# Patient Record
Sex: MALE | Race: WHITE | NOT HISPANIC OR LATINO | URBAN - METROPOLITAN AREA
[De-identification: names, ages, dates, MRNs, and addresses within clinical notes are randomized per-mention and may not be internally consistent; named-entity substitution may affect disease eponyms.]

---

## 2017-11-15 ENCOUNTER — INPATIENT (INPATIENT)
Facility: HOSPITAL | Age: 77
LOS: 2 days | Discharge: HOME | DRG: 244 | End: 2017-11-18
Attending: HOSPITALIST | Admitting: HOSPITALIST
Payer: COMMERCIAL

## 2017-11-15 VITALS
HEART RATE: 40 BPM | TEMPERATURE: 98 F | RESPIRATION RATE: 16 BRPM | OXYGEN SATURATION: 100 % | DIASTOLIC BLOOD PRESSURE: 51 MMHG | SYSTOLIC BLOOD PRESSURE: 139 MMHG

## 2017-11-15 DIAGNOSIS — Z29.9 ENCOUNTER FOR PROPHYLACTIC MEASURES, UNSPECIFIED: ICD-10-CM

## 2017-11-15 DIAGNOSIS — I10 ESSENTIAL (PRIMARY) HYPERTENSION: ICD-10-CM

## 2017-11-15 DIAGNOSIS — R00.1 BRADYCARDIA, UNSPECIFIED: ICD-10-CM

## 2017-11-15 DIAGNOSIS — R55 SYNCOPE AND COLLAPSE: ICD-10-CM

## 2017-11-15 LAB
ALBUMIN SERPL ELPH-MCNC: 4.5 G/DL — SIGNIFICANT CHANGE UP (ref 3.3–5)
ALP SERPL-CCNC: 53 U/L — SIGNIFICANT CHANGE UP (ref 40–120)
ALT FLD-CCNC: 13 U/L RC — SIGNIFICANT CHANGE UP (ref 10–45)
ANION GAP SERPL CALC-SCNC: 11 MMOL/L — SIGNIFICANT CHANGE UP (ref 5–17)
APTT BLD: 28.1 SEC — SIGNIFICANT CHANGE UP (ref 27.5–37.4)
AST SERPL-CCNC: 14 U/L — SIGNIFICANT CHANGE UP (ref 10–40)
BASE EXCESS BLDV CALC-SCNC: 2 MMOL/L — SIGNIFICANT CHANGE UP (ref -2–2)
BASOPHILS # BLD AUTO: 0 K/UL — SIGNIFICANT CHANGE UP (ref 0–0.2)
BASOPHILS NFR BLD AUTO: 0 % — SIGNIFICANT CHANGE UP (ref 0–2)
BILIRUB SERPL-MCNC: 0.5 MG/DL — SIGNIFICANT CHANGE UP (ref 0.2–1.2)
BUN SERPL-MCNC: 19 MG/DL — SIGNIFICANT CHANGE UP (ref 7–23)
CA-I SERPL-SCNC: 1.17 MMOL/L — SIGNIFICANT CHANGE UP (ref 1.12–1.3)
CALCIUM SERPL-MCNC: 9.2 MG/DL — SIGNIFICANT CHANGE UP (ref 8.4–10.5)
CHLORIDE BLDV-SCNC: 104 MMOL/L — SIGNIFICANT CHANGE UP (ref 96–108)
CHLORIDE SERPL-SCNC: 101 MMOL/L — SIGNIFICANT CHANGE UP (ref 96–108)
CO2 BLDV-SCNC: 31 MMOL/L — HIGH (ref 22–30)
CO2 SERPL-SCNC: 27 MMOL/L — SIGNIFICANT CHANGE UP (ref 22–31)
CREAT SERPL-MCNC: 0.95 MG/DL — SIGNIFICANT CHANGE UP (ref 0.5–1.3)
EOSINOPHIL # BLD AUTO: 0.2 K/UL — SIGNIFICANT CHANGE UP (ref 0–0.5)
EOSINOPHIL NFR BLD AUTO: 2.3 % — SIGNIFICANT CHANGE UP (ref 0–6)
GAS PNL BLDV: 133 MMOL/L — LOW (ref 136–145)
GAS PNL BLDV: SIGNIFICANT CHANGE UP
GLUCOSE BLDV-MCNC: 152 MG/DL — HIGH (ref 70–99)
GLUCOSE SERPL-MCNC: 147 MG/DL — HIGH (ref 70–99)
HCO3 BLDV-SCNC: 29 MMOL/L — SIGNIFICANT CHANGE UP (ref 21–29)
HCT VFR BLD CALC: 48.4 % — SIGNIFICANT CHANGE UP (ref 39–50)
HCT VFR BLDA CALC: 50 % — SIGNIFICANT CHANGE UP (ref 39–50)
HGB BLD CALC-MCNC: 16.4 G/DL — SIGNIFICANT CHANGE UP (ref 13–17)
HGB BLD-MCNC: 16.2 G/DL — SIGNIFICANT CHANGE UP (ref 13–17)
INR BLD: 1.05 RATIO — SIGNIFICANT CHANGE UP (ref 0.88–1.16)
LACTATE BLDV-MCNC: 2 MMOL/L — SIGNIFICANT CHANGE UP (ref 0.7–2)
LIDOCAIN IGE QN: 52 U/L — SIGNIFICANT CHANGE UP (ref 7–60)
LYMPHOCYTES # BLD AUTO: 2.2 K/UL — SIGNIFICANT CHANGE UP (ref 1–3.3)
LYMPHOCYTES # BLD AUTO: 20.9 % — SIGNIFICANT CHANGE UP (ref 13–44)
MAGNESIUM SERPL-MCNC: 2.1 MG/DL — SIGNIFICANT CHANGE UP (ref 1.6–2.6)
MCHC RBC-ENTMCNC: 30.6 PG — SIGNIFICANT CHANGE UP (ref 27–34)
MCHC RBC-ENTMCNC: 33.4 GM/DL — SIGNIFICANT CHANGE UP (ref 32–36)
MCV RBC AUTO: 91.6 FL — SIGNIFICANT CHANGE UP (ref 80–100)
MONOCYTES # BLD AUTO: 0.7 K/UL — SIGNIFICANT CHANGE UP (ref 0–0.9)
MONOCYTES NFR BLD AUTO: 6.3 % — SIGNIFICANT CHANGE UP (ref 2–14)
NEUTROPHILS # BLD AUTO: 7.5 K/UL — HIGH (ref 1.8–7.4)
NEUTROPHILS NFR BLD AUTO: 70.5 % — SIGNIFICANT CHANGE UP (ref 43–77)
NT-PROBNP SERPL-SCNC: 43 PG/ML — SIGNIFICANT CHANGE UP (ref 0–300)
PCO2 BLDV: 56 MMHG — HIGH (ref 35–50)
PH BLDV: 7.33 — LOW (ref 7.35–7.45)
PHOSPHATE SERPL-MCNC: 3.8 MG/DL — SIGNIFICANT CHANGE UP (ref 2.5–4.5)
PLATELET # BLD AUTO: 263 K/UL — SIGNIFICANT CHANGE UP (ref 150–400)
PO2 BLDV: 21 MMHG — LOW (ref 25–45)
POTASSIUM BLDV-SCNC: 3.8 MMOL/L — SIGNIFICANT CHANGE UP (ref 3.5–5)
POTASSIUM SERPL-MCNC: 4.2 MMOL/L — SIGNIFICANT CHANGE UP (ref 3.5–5.3)
POTASSIUM SERPL-SCNC: 4.2 MMOL/L — SIGNIFICANT CHANGE UP (ref 3.5–5.3)
PROT SERPL-MCNC: 6.4 G/DL — SIGNIFICANT CHANGE UP (ref 6–8.3)
PROTHROM AB SERPL-ACNC: 11.4 SEC — SIGNIFICANT CHANGE UP (ref 9.8–12.7)
RBC # BLD: 5.28 M/UL — SIGNIFICANT CHANGE UP (ref 4.2–5.8)
RBC # FLD: 11.8 % — SIGNIFICANT CHANGE UP (ref 10.3–14.5)
SAO2 % BLDV: 25 % — LOW (ref 67–88)
SODIUM SERPL-SCNC: 139 MMOL/L — SIGNIFICANT CHANGE UP (ref 135–145)
TROPONIN T SERPL-MCNC: <0.01 NG/ML — SIGNIFICANT CHANGE UP (ref 0–0.06)
WBC # BLD: 10.6 K/UL — HIGH (ref 3.8–10.5)
WBC # FLD AUTO: 10.6 K/UL — HIGH (ref 3.8–10.5)

## 2017-11-15 PROCEDURE — 93010 ELECTROCARDIOGRAM REPORT: CPT

## 2017-11-15 PROCEDURE — 71010: CPT | Mod: 26

## 2017-11-15 PROCEDURE — 99285 EMERGENCY DEPT VISIT HI MDM: CPT | Mod: 25

## 2017-11-15 PROCEDURE — 99223 1ST HOSP IP/OBS HIGH 75: CPT | Mod: GC

## 2017-11-15 RX ORDER — HEPARIN SODIUM 5000 [USP'U]/ML
5000 INJECTION INTRAVENOUS; SUBCUTANEOUS EVERY 8 HOURS
Qty: 0 | Refills: 0 | Status: DISCONTINUED | OUTPATIENT
Start: 2017-11-15 | End: 2017-11-18

## 2017-11-15 RX ORDER — ASPIRIN/CALCIUM CARB/MAGNESIUM 324 MG
1 TABLET ORAL
Qty: 0 | Refills: 0 | COMMUNITY

## 2017-11-15 RX ORDER — ATROPINE SULFATE 0.1 MG/ML
0.5 SYRINGE (ML) INJECTION ONCE
Qty: 0 | Refills: 0 | Status: DISCONTINUED | OUTPATIENT
Start: 2017-11-15 | End: 2017-11-18

## 2017-11-15 RX ORDER — SODIUM CHLORIDE 9 MG/ML
3 INJECTION INTRAMUSCULAR; INTRAVENOUS; SUBCUTANEOUS ONCE
Qty: 0 | Refills: 0 | Status: COMPLETED | OUTPATIENT
Start: 2017-11-15 | End: 2017-11-15

## 2017-11-15 RX ORDER — ATROPINE SULFATE 0.1 MG/ML
0.5 SYRINGE (ML) INJECTION ONCE
Qty: 0 | Refills: 0 | Status: DISCONTINUED | OUTPATIENT
Start: 2017-11-15 | End: 2017-11-15

## 2017-11-15 RX ORDER — SODIUM CHLORIDE 9 MG/ML
1000 INJECTION INTRAMUSCULAR; INTRAVENOUS; SUBCUTANEOUS ONCE
Qty: 0 | Refills: 0 | Status: COMPLETED | OUTPATIENT
Start: 2017-11-15 | End: 2017-11-15

## 2017-11-15 RX ORDER — TRIAMTERENE/HYDROCHLOROTHIAZID 75 MG-50MG
1 TABLET ORAL
Qty: 0 | Refills: 0 | COMMUNITY

## 2017-11-15 RX ORDER — INFLUENZA VIRUS VACCINE 15; 15; 15; 15 UG/.5ML; UG/.5ML; UG/.5ML; UG/.5ML
0.5 SUSPENSION INTRAMUSCULAR ONCE
Qty: 0 | Refills: 0 | Status: DISCONTINUED | OUTPATIENT
Start: 2017-11-15 | End: 2017-11-18

## 2017-11-15 RX ADMIN — SODIUM CHLORIDE 3 MILLILITER(S): 9 INJECTION INTRAMUSCULAR; INTRAVENOUS; SUBCUTANEOUS at 12:53

## 2017-11-15 RX ADMIN — SODIUM CHLORIDE 2000 MILLILITER(S): 9 INJECTION INTRAMUSCULAR; INTRAVENOUS; SUBCUTANEOUS at 13:33

## 2017-11-15 RX ADMIN — HEPARIN SODIUM 5000 UNIT(S): 5000 INJECTION INTRAVENOUS; SUBCUTANEOUS at 21:32

## 2017-11-15 NOTE — ED ADULT NURSE REASSESSMENT NOTE - NS ED NURSE REASSESS COMMENT FT1
Pt aaox4. Denies CP Dizziness weakness or SOB. Pt maintained on CM and Pacer pads. HR 80's. Report given to Holding.

## 2017-11-15 NOTE — H&P ADULT - PROBLEM SELECTOR PLAN 1
- 2/2 bradycardia in the setting of found first degree heart block - - not on any AV charles blocking agents, no prior episodes in recent history   - witnessed, alert immediately after awaking, no reported tonic-clonic episodes and no reported head trauma - 2/2 bradycardia in the setting of found first degree heart block - not on any AV charles blocking agents, no prior episodes in recent history   - witnessed, alert immediately after awaking, no reported tonic-clonic episodes and no reported head trauma  - no infectious complaints that may have triggered it  - check stress echo to eval heart function  - check tsh, a1c, lipid panel

## 2017-11-15 NOTE — H&P ADULT - NSHPSOCIALHISTORY_GEN_ALL_CORE
Works as a [employer] Currently. Reports drinking 1 glass of wine or 1 beer per day. Denies smoking, denies recreational drug use Works as a [employer] Currently. Reports drinking 1 glass of wine or 1 beer per day. Denies smoking, denies recreational drug use. Ambulates without assistance.

## 2017-11-15 NOTE — ED PROVIDER NOTE - ATTENDING CONTRIBUTION TO CARE
Private Physician Ermias Kapoor [facility]  77y male pmh HTN,HLD, Rbbb,BPH, pt was in pmd office for routine visit for referral to Derm for growth on rt forehead. Pt was sitting waiting and had syncopal event. No chest cp,sob, fever chills. Now feels fine. PE WDWN male nad normocephalic atraumatic rt forehead raised nontender growth, chest clear anterior & posterior cv slow hr. cv no rubs, gallops or murmurs, abd soft neuro no focal defects  Moi German MD, Facep

## 2017-11-15 NOTE — PROGRESS NOTE ADULT - PROBLEM SELECTOR PLAN 1
-With significant Bradycardia -With significant Bradycardia  -Currently HR improved to NSR 90's without medications  -Monitor on telemetry, NPO after midnight for possible pacemaker if patient remains bradycardic  -Follow up TSH  -Obtain transthoracic echocardiogram   -Obtain Stress Test to evaluate for ischemic sinus node dysfunction     [place] [id number]

## 2017-11-15 NOTE — ED PROVIDER NOTE - OBJECTIVE STATEMENT
77 y.o. male BIBA from the [facility] office at [address] after being found to be bradycardic and having a syncopal episode.  Pt was following up for a referral due to a skin lesion on the right side of forehead.  Has a history of HTN, not on any beta blockers of CCB's.  Was at the office when he got lightheaded while sitting and passed out, seen by office staff that denied any seizure like activity.  Never had any CP, or SoB, currently without complaints.

## 2017-11-15 NOTE — H&P ADULT - PROBLEM SELECTOR PLAN 4
- Awaiting EP recs, if no plan for PPM - - HSQ 5000 q8    Emelia Neil  PGY1  [phone] - Awaiting EP recs, patient currently asymptomatic but has episodes of dropped beats with predominant 1st degree AV block.      Emelia Neil  PGY1  [phone] DVT PPX: hsq  No PT/OT needs    Emelia Neil  PGY1  [phone]

## 2017-11-15 NOTE — PROGRESS NOTE ADULT - ASSESSMENT
77 year old male with PMH of HTN who presents from outside medical office for syncope. Patient was found to have bradycardia with 1st degree heart block.

## 2017-11-15 NOTE — ED ADULT NURSE REASSESSMENT NOTE - NS ED NURSE REASSESS COMMENT FT1
Patient c/o feeling dizzy while heart rate in 20's. Denies shortness of breath or chest pain. Dr. German made aware. Pacer pads placed on patient as a precaution.

## 2017-11-15 NOTE — H&P ADULT - ASSESSMENT
77 year old male with PMH of HTN who presented from [facility] for syncopal episode and found to have first degree heart block 77 year old male with PMH of HTN who presented from [facility] for syncopal episode and found to have symptomatic bradycardia w/  first degree heart block.

## 2017-11-15 NOTE — H&P ADULT - PROBLEM SELECTOR PLAN 3
- at home on triamterene- HCTZ and amlodipine-benazepril   - will continue to monitor - at home on triamterene- HCTZ and amlodipine-benazepril   - will continue to monitor, off of any hypertensive - - pressures currently within range

## 2017-11-15 NOTE — H&P ADULT - ATTENDING COMMENTS
Symptomatic bradycardia w/ 1st deg heart block, RBBB and syncope. EP consulted - to check stress echo and place NPO MN for possible PPM tomorrow. Monitor BP off Bp meds for now as BP controlled.

## 2017-11-15 NOTE — ED ADULT NURSE REASSESSMENT NOTE - NS ED NURSE REASSESS COMMENT FT1
resting in bed, vital sstable, denies c/o, +adm, wife present resting in bed, vital sstable, denies c/o, +adm, wife present, +pacer pads in place

## 2017-11-15 NOTE — H&P ADULT - NSHPPHYSICALEXAM_GEN_ALL_CORE
PHYSICAL EXAM:  Constitutional: Elderly gentleman, appears well lying comfortable  Eyes: PERRL, EOMI.   ENMT: No evidence of mucosal lesions or ulcers on inspection of the oral airway  Neck: Supple, no evidence of cervical lymphadenopathy, thyroid not enlarged    Respiratory: No evidence of increased respiratory effort on inspection. On auscultation, lungs are clear. No wheezes, rales or rhonchi   Cardiovascular: Regular rate and rhythm. Normal S1 and S2, no murmurs rubs or gallops  Gastrointestinal: Abdomen soft, nondistended. Bowel sounds present. No organomegaly   Extremities: Extremities well perfused. No cyanosis, pitting or edema. 2+ DP pulses   Neurological: AAO x 3. Cr II-XII intact. Grossly nonfocal, moving all extremities spontaneously   Skin: On temple note of 2 x 2 cm waxy and dark elevated lesion with abutting elevated salmon colored and elevated lesion.    Lymph Nodes: No cervical or supraclavicular lymphadenopathy appreciated. PHYSICAL EXAM:  Constitutional: Elderly gentleman, appears well lying comfortable  Eyes: PERRL, EOMI.   ENMT: No evidence of mucosal lesions or ulcers on inspection of the oral airway  Neck: Supple, no evidence of cervical lymphadenopathy, thyroid not enlarged    Respiratory: No evidence of increased respiratory effort on inspection. On auscultation, lungs are clear. No wheezes, rales or rhonchi   Cardiovascular: Regular rate and rhythm. Normal S1 and S2, no murmurs rubs or gallops  Gastrointestinal: Abdomen soft, nondistended. Bowel sounds present. No organomegaly   Extremities: Extremities well perfused. No cyanosis, pitting or edema. 2+ DP pulses   Neurological: AAO x 3. Cr II-XII intact. Grossly nonfocal, moving all extremities spontaneously   Skin: On temple note of 2 x 2 cm waxy and dark elevated lesion with abutting elevated salmon colored elevated lesion w/o TTP. r cheek w/ seborrheic keratosis.  Lymph Nodes: No cervical or supraclavicular lymphadenopathy appreciated.

## 2017-11-15 NOTE — H&P ADULT - NSHPLABSRESULTS_GEN_ALL_CORE
16.2   10.6  )-----------( 263      ( 15 Nov 2017 12:24 )             48.4     11-15    139  |  101  |  19  ----------------------------<  147<H>  4.2   |  27  |  0.95    Ca    9.2      15 Nov 2017 12:24  Phos  3.8     11-15  Mg     2.1     11-15    TPro  6.4  /  Alb  4.5  /  TBili  0.5  /  DBili  x   /  AST  14  /  ALT  13  /  AlkPhos  53  11-15    CXR: performed, unable to access image 16.2   10.6  )-----------( 263      ( 15 Nov 2017 12:24 )             48.4     11-15    139  |  101  |  19  ----------------------------<  147<H>  4.2   |  27  |  0.95    Ca    9.2      15 Nov 2017 12:24  Phos  3.8     11-15  Mg     2.1     11-15    TPro  6.4  /  Alb  4.5  /  TBili  0.5  /  DBili  x   /  AST  14  /  ALT  13  /  AlkPhos  53  11-15    LABS REVIEWED - wbc mildly elevated, trop/bnp wnl, bmp wnl.  CXR personally reviewed: clear, no infiltrates  EKG personally reviewed sinus atif W/ 1st deg heart block, hr 38 qtc 418, no sted, RBBB.

## 2017-11-15 NOTE — H&P ADULT - PROBLEM SELECTOR PLAN 2
- found to have 1st degree heart block with intermittent episodes of second degree - - not on any AV charles blocking agents   - EP consulted for evaluation for pacemaker placement   - currently Asymptomatic, elicited with orthostatic posturing and has transient periods during interview - found to have 1st degree heart block with intermittent episodes of second degree and notable right bundle - - not on any AV charles blocking agents   - EP consulted for evaluation for pacemaker placement   - currently Asymptomatic, elicited with orthostatic posturing and has transient periods during interview - found to have symptomatic 1st degree heart block, not on any AV charles blocking agents, w/ hr in 30s  - EP consulted for evaluation for pacemaker placement - place NPO MN for possible PPM - found to have symptomatic 1st degree heart block, not on any AV charles blocking agents, w/ hr in 30s  - EP consulted for evaluation for pacemaker placement - place NPO MN for possible PPM  - keep atropine at bedside prn, keep pacer pads on  - tele

## 2017-11-15 NOTE — ED ADULT NURSE REASSESSMENT NOTE - NS ED NURSE REASSESS COMMENT FT1
At this time awaiting bed assignment. Close monitoring provided. Denies pain or discomfort at this time.

## 2017-11-15 NOTE — ED ADULT NURSE REASSESSMENT NOTE - NS ED NURSE REASSESS COMMENT FT1
Patient's heart rate noted to be 29. Awake and alert, denies any pain or discomfort. BP systolic in 90's. CHARITO Hernandes made aware. IVF fluids administered as ordered.

## 2017-11-15 NOTE — H&P ADULT - HISTORY OF PRESENT ILLNESS
77 year old male with PMH of HTN who presents from outside medical office for syncope. Patient was at [facility] for dermatology referral and reports sitting down, feeling like he was going to faint and had passed out. Denies bearing down, chest pain, palpitations or shortness of breath. Denies falling or hitting his head at the time. Fainted momentarily but awoke and knew where he was without the need for reorientation. At the office was noted to have bradycardia with 1st degree heart block which persisted while in the ER. As per tele strip, also had transient period of 2nd degree AV block with bradycardia into the 20s. Was given fluid bolus with resolution.     Of note, patient has had 1 episode 40 years ago where he syncopized ( as per wife - - was given novocaine at the time). No syncopal episodes in the recent history. Has never seen a cardiologist in the past. Reports following up regularly wit his PCP who now left the practice. 77 year old male with PMH of HTN who presents from outside medical office for syncope. Patient was at [facility] asking for dermatology referral for a facial lesion. He reports while sitting down, feeling like he was going to faint and syncopized for a few seconds. Denies bearing down, chest pain, palpitations or shortness of breath or postsyncopal shaking, bowel/bladder incontinence. Denies falling or hitting his head at the time or any trauma. Fainted momentarily but awoke and knew where he was without the need for reorientation. Reports feeling well otherwise this week - no fevers, chills, abd. pain, cough, diarrhea, rash, dysuria. At the office was noted to have bradycardia with 1st degree heart block which persisted while in the ER. Was given fluid bolus in the ED.    Of note, patient has had 1 episode 40 years ago where he syncopized ( as per wife - - was given novocaine at the time). No syncopal episodes in the recent history. Has never seen a cardiologist in the past. Reports following up regularly with his PCP who now left the practice.

## 2017-11-15 NOTE — ED ADULT NURSE NOTE - OBJECTIVE STATEMENT
BIBA from PMD office. Patient states he went to his PMD to obtain a referral to dermatologist for skin growth on the right side of his forehead and he passed out while sitting on the examination table while waiting for his doctor in the room. Denies falling to the ground or hitting his head. At this time denies any chest pain or shortness of breath. EKG done at bedside and patient placed on telemetry. Irregular heart rate and bradycardia, rate in 30's - 40's noted on cardiac monitor.

## 2017-11-15 NOTE — ED PROVIDER NOTE - PROGRESS NOTE DETAILS
Got a call from war room that pt had episode of 2nd degree heart block, will call cards consult.  Pt without complaints.  Donnell pt's heart rate in the mid 20's called EP fellow will be down ASAP.  Pt without chest pain, lightheaded, or SoB.  Donnell Pt laying supine,  HR 55

## 2017-11-16 ENCOUNTER — TRANSCRIPTION ENCOUNTER (OUTPATIENT)
Age: 77
End: 2017-11-16

## 2017-11-16 LAB
ALBUMIN SERPL ELPH-MCNC: 4.1 G/DL — SIGNIFICANT CHANGE UP (ref 3.3–5)
ALP SERPL-CCNC: 50 U/L — SIGNIFICANT CHANGE UP (ref 40–120)
ALT FLD-CCNC: 15 U/L RC — SIGNIFICANT CHANGE UP (ref 10–45)
ANION GAP SERPL CALC-SCNC: 14 MMOL/L — SIGNIFICANT CHANGE UP (ref 5–17)
AST SERPL-CCNC: 20 U/L — SIGNIFICANT CHANGE UP (ref 10–40)
BILIRUB SERPL-MCNC: 0.9 MG/DL — SIGNIFICANT CHANGE UP (ref 0.2–1.2)
BUN SERPL-MCNC: 17 MG/DL — SIGNIFICANT CHANGE UP (ref 7–23)
CALCIUM SERPL-MCNC: 8.9 MG/DL — SIGNIFICANT CHANGE UP (ref 8.4–10.5)
CHLORIDE SERPL-SCNC: 102 MMOL/L — SIGNIFICANT CHANGE UP (ref 96–108)
CHOLEST SERPL-MCNC: 175 MG/DL — SIGNIFICANT CHANGE UP (ref 10–199)
CK MB BLD-MCNC: 2.5 % — SIGNIFICANT CHANGE UP (ref 0–3.5)
CK MB CFR SERPL CALC: 10.1 NG/ML — HIGH (ref 0–6.7)
CK SERPL-CCNC: 409 U/L — HIGH (ref 30–200)
CO2 SERPL-SCNC: 25 MMOL/L — SIGNIFICANT CHANGE UP (ref 22–31)
CREAT SERPL-MCNC: 0.87 MG/DL — SIGNIFICANT CHANGE UP (ref 0.5–1.3)
GLUCOSE SERPL-MCNC: 95 MG/DL — SIGNIFICANT CHANGE UP (ref 70–99)
HBA1C BLD-MCNC: 5.7 % — HIGH (ref 4–5.6)
HCT VFR BLD CALC: 47.1 % — SIGNIFICANT CHANGE UP (ref 39–50)
HDLC SERPL-MCNC: 50 MG/DL — SIGNIFICANT CHANGE UP (ref 40–125)
HGB BLD-MCNC: 15.6 G/DL — SIGNIFICANT CHANGE UP (ref 13–17)
LIPID PNL WITH DIRECT LDL SERPL: 103 MG/DL — SIGNIFICANT CHANGE UP
MAGNESIUM SERPL-MCNC: 2.2 MG/DL — SIGNIFICANT CHANGE UP (ref 1.6–2.6)
MCHC RBC-ENTMCNC: 30.3 PG — SIGNIFICANT CHANGE UP (ref 27–34)
MCHC RBC-ENTMCNC: 33 GM/DL — SIGNIFICANT CHANGE UP (ref 32–36)
MCV RBC AUTO: 91.6 FL — SIGNIFICANT CHANGE UP (ref 80–100)
PHOSPHATE SERPL-MCNC: 3.4 MG/DL — SIGNIFICANT CHANGE UP (ref 2.5–4.5)
PLATELET # BLD AUTO: 276 K/UL — SIGNIFICANT CHANGE UP (ref 150–400)
POTASSIUM SERPL-MCNC: 3.8 MMOL/L — SIGNIFICANT CHANGE UP (ref 3.5–5.3)
POTASSIUM SERPL-SCNC: 3.8 MMOL/L — SIGNIFICANT CHANGE UP (ref 3.5–5.3)
PROT SERPL-MCNC: 6.4 G/DL — SIGNIFICANT CHANGE UP (ref 6–8.3)
RBC # BLD: 5.14 M/UL — SIGNIFICANT CHANGE UP (ref 4.2–5.8)
RBC # FLD: 12.2 % — SIGNIFICANT CHANGE UP (ref 10.3–14.5)
SODIUM SERPL-SCNC: 141 MMOL/L — SIGNIFICANT CHANGE UP (ref 135–145)
TOTAL CHOLESTEROL/HDL RATIO MEASUREMENT: 3.5 RATIO — SIGNIFICANT CHANGE UP (ref 3.4–9.6)
TRIGL SERPL-MCNC: 112 MG/DL — SIGNIFICANT CHANGE UP (ref 10–149)
TROPONIN T SERPL-MCNC: <0.01 NG/ML — SIGNIFICANT CHANGE UP (ref 0–0.06)
TSH SERPL-MCNC: 2.24 UIU/ML — SIGNIFICANT CHANGE UP (ref 0.27–4.2)
WBC # BLD: 11.4 K/UL — HIGH (ref 3.8–10.5)
WBC # FLD AUTO: 11.4 K/UL — HIGH (ref 3.8–10.5)

## 2017-11-16 PROCEDURE — 99233 SBSQ HOSP IP/OBS HIGH 50: CPT | Mod: GC

## 2017-11-16 PROCEDURE — 93306 TTE W/DOPPLER COMPLETE: CPT | Mod: 26

## 2017-11-16 RX ADMIN — HEPARIN SODIUM 5000 UNIT(S): 5000 INJECTION INTRAVENOUS; SUBCUTANEOUS at 21:43

## 2017-11-16 RX ADMIN — HEPARIN SODIUM 5000 UNIT(S): 5000 INJECTION INTRAVENOUS; SUBCUTANEOUS at 13:10

## 2017-11-16 RX ADMIN — HEPARIN SODIUM 5000 UNIT(S): 5000 INJECTION INTRAVENOUS; SUBCUTANEOUS at 05:22

## 2017-11-16 NOTE — PROGRESS NOTE ADULT - PROBLEM SELECTOR PLAN 3
- at home on triamterene- HCTZ and amlodipine-benazepril   - will continue to monitor, off of any hypertensive - - pressures currently within range

## 2017-11-16 NOTE — DISCHARGE NOTE ADULT - PATIENT PORTAL LINK FT
“You can access the FollowHealth Patient Portal, offered by [facility], by registering with the following website: [email or web address]”

## 2017-11-16 NOTE — DISCHARGE NOTE ADULT - PLAN OF CARE
resolution You were brought into the hospital because you had fainted at the doctor's office where you were found to have a very slow heart rate. Because of this, you had a pacemaker placed. Please follow up with the electrophysiologist within 1 week of leaving the hospital Treatment You were brought into the hospital because you had fainted and were found to have a very slow heart rate. Due to this, you had a pacemaker placed during this admission. Please follow up with the electrophysiologist within 1 week of leaving the hospital. During this admission, your blood pressure medications were held because of a low heart rate and normal blood pressures. Please follow up with your primary care physician within 1 week of leaving the hospital prior to restarting this medications. During this admission, your blood pressure medications were held because of a low heart rate and normal blood pressures. You will be sent on a new blood pressure medication. Please follow up with your primary care physician within 1 week for medication adjustment.

## 2017-11-16 NOTE — PROGRESS NOTE ADULT - PROBLEM SELECTOR PLAN 4
DVT PPX: hsq  No PT/OT needs    Emelia Neil  PGY1  [phone] DVT PPX: hsq  No PT/OT needs    NPO after midnight for PPM placement, patient unable to get nuclear stress test as it requires AV charles blocking agent. Will touch base with cardiology but it will not impact patient getting PPM tomorrow     Emelia Neil  PGY1  [phone]

## 2017-11-16 NOTE — PROGRESS NOTE ADULT - SUBJECTIVE AND OBJECTIVE BOX
INTERVAL HPI/OVERNIGHT EVENTS: Patient resting comfortably in bed, denies c/o dizziness, lightheadedness, CP, palpitations or SOB. Overnight periods of sinus bradycardia 30's- 40's with 3.3 second pause    MEDICATIONS  (STANDING):  heparin  Injectable 5000 Unit(s) SubCutaneous every 8 hours  influenza   Vaccine 0.5 milliLiter(s) IntraMuscular once    MEDICATIONS  (PRN):  atropine Injectable 0.5 milliGRAM(s) IV Push once PRN please keep at bedside for MD if pt has symptomatic bradycardia and decompensates.      Allergies    No Known Allergies    Intolerances      ROS:  General: Pt denies fever/chills    Cardiovascular: denies chest pain/palpitations/leg edema    Respiratory and Thorax: denies SOB/cough/wheezing    Gastrointestinal: denies abdominal pain/diarrhea/constipation/bloody stool    Musculoskeletal: denies joint pain or swelling, denies restricted motion    Skin: denies rashes/sores    Hematologic: denies abnormal bleeding    Vital Signs Last 24 Hrs  T(C): 36.6 (16 Nov 2017 04:31), Max: 36.7 (15 Nov 2017 19:24)  T(F): 97.9 (16 Nov 2017 04:31), Max: 98 (15 Nov 2017 19:24)  HR: 67 (16 Nov 2017 07:03) (50 - 81)  BP: 129/72 (16 Nov 2017 07:03) (105/53 - 164/81)  RR: 18 (16 Nov 2017 04:31) (15 - 18)  SpO2: 95% (16 Nov 2017 04:31) (95% - 98%)    Physical Exam:  Constitutional: well developed, well nourished,  and no acute distress    Neurological: Alert & Oriented x 3,  no focal deficits    HEENT:   Neck supple.    Respiratory: CTA B/L, No wheezing/crackles/rhonchi    Cardiovascular: (+) S1 & S2, RRR    Gastrointestinal: soft, NT, nondistended, (+) BS    Genitourinary: non distended bladder, voiding freely    Extremities: No pedal edema, No clubbing, No cyanosis    Skin:  normal skin color and pigmentation, no skin lesions      LABS:                        15.6   11.4  )-----------( 276      ( 16 Nov 2017 06:49 )             47.1     11-16    141  |  102  |  17  ----------------------------<  95  3.8   |  25  |  0.87    Ca    8.9      16 Nov 2017 06:49  Phos  3.4     11-16  Mg     2.2     11-16    TPro  6.4  /  Alb  4.1  /  TBili  0.9  /  DBili  x   /  AST  20  /  ALT  15  /  AlkPhos  50  11-16    PT/INR - ( 15 Nov 2017 12:24 )   PT: 11.4 sec;   INR: 1.05 ratio         PTT - ( 15 Nov 2017 12:24 )  PTT:28.1 sec          TELE: SB/ NSR 40's- 60

## 2017-11-16 NOTE — DISCHARGE NOTE ADULT - ADDITIONAL INSTRUCTIONS
1) Please follow up with electrophysiology within 1 week of leaving the hospital regarding the pacemaker  2) Please follow up with your primary care physician to see if your blood pressure medications should be restarted

## 2017-11-16 NOTE — PROGRESS NOTE ADULT - PROBLEM SELECTOR PLAN 2
- found to have symptomatic 1st degree heart block, not on any AV charles blocking agents, w/ hr in 30s  - EP consulted for evaluation for pacemaker placement - place NPO MN for possible PPM  - keep atropine at bedside prn, keep pacer pads on  - tele - found to have symptomatic 1st degree heart block, not on any AV charles blocking agents, w/ hr in 30s  - EP consulted for evaluation for pacemaker placement - place NPO MN tonight for PPM  - keep atropine at bedside prn, keep pacer pads on  - tele

## 2017-11-16 NOTE — PROGRESS NOTE ADULT - ASSESSMENT
77 year old male with PMH of HTN who presented from [facility] for syncopal episode and found to have symptomatic bradycardia w/  first degree heart block. 77 year old male with PMH of HTN who presented from [facility] for syncopal episode and found to have symptomatic bradycardia w/  first degree heart block pending stress test and PPM placement 11/17.

## 2017-11-16 NOTE — PROGRESS NOTE ADULT - ASSESSMENT
77 year old male with PMH of HTN who presents from outside medical office for syncope. Patient was found to have bradycardia with 1st degree heart block., 3.3 second pause.

## 2017-11-16 NOTE — DISCHARGE NOTE ADULT - CARE PROVIDERS DIRECT ADDRESSES
[email or web address]

## 2017-11-16 NOTE — PROGRESS NOTE ADULT - SUBJECTIVE AND OBJECTIVE BOX
Patient is a 77y old  Male who presents with a chief complaint of Syncope at outside office (15 Nov 2017 15:23)        SUBJECTIVE / OVERNIGHT EVENTS:      MEDICATIONS  (STANDING):  heparin  Injectable 5000 Unit(s) SubCutaneous every 8 hours  influenza   Vaccine 0.5 milliLiter(s) IntraMuscular once    MEDICATIONS  (PRN):  atropine Injectable 0.5 milliGRAM(s) IV Push once PRN please keep at bedside for MD if pt has symptomatic bradycardia and decompensates.    Vital Signs Last 24 Hrs  T(C): 36.6 (16 Nov 2017 04:31), Max: 36.7 (15 Nov 2017 19:24)  T(F): 97.9 (16 Nov 2017 04:31), Max: 98 (15 Nov 2017 19:24)  HR: 68 (16 Nov 2017 04:31) (29 - 81)  BP: 124/60 (16 Nov 2017 04:31) (92/35 - 164/81)  BP(mean): --  RR: 18 (16 Nov 2017 04:31) (15 - 18)  SpO2: 95% (16 Nov 2017 04:31) (95% - 100%)    PHYSICAL EXAM  GENERAL: NAD, well-developed  HEAD:  Atraumatic, Normocephalic  EYES: EOMI, PERRLA, conjunctiva and sclera clear  NECK: Supple, No JVD  CHEST/LUNG: Clear to auscultation bilaterally; No wheeze  HEART: Regular rate and rhythm; No murmurs, rubs, or gallops  ABDOMEN: Soft, Nontender, Nondistended; Bowel sounds present  EXTREMITIES:  2+ Peripheral Pulses, No clubbing, cyanosis, or edema  PSYCH: AAOx3  SKIN: No rashes or lesions    LABS:                        16.2   10.6  )-----------( 263      ( 15 Nov 2017 12:24 )             48.4     11-15    139  |  101  |  19  ----------------------------<  147<H>  4.2   |  27  |  0.95    Ca    9.2      15 Nov 2017 12:24  Phos  3.8     11-15  Mg     2.1     11-15    TPro  6.4  /  Alb  4.5  /  TBili  0.5  /  DBili  x   /  AST  14  /  ALT  13  /  AlkPhos  53  11-15    PT/INR - ( 15 Nov 2017 12:24 )   PT: 11.4 sec;   INR: 1.05 ratio         PTT - ( 15 Nov 2017 12:24 )  PTT:28.1 sec  CARDIAC MARKERS ( 15 Nov 2017 12:24 )  x     / <0.01 ng/mL / x     / x     / x              RADIOLOGY & ADDITIONAL TESTS:    Imaging Personally Reviewed:  Consultant(s) Notes Reviewed:    Care Discussed with Consultants/Other Providers: Patient is a 77y old  Male who presents with a chief complaint of Syncope at outside office (15 Nov 2017 15:23)        SUBJECTIVE / OVERNIGHT EVENTS:  Overnight, tele with bradycardia down to 30s, asymptomatic. Pt seen at bedside denies any complaints including cp, sob, abd pain, n/v/f, dizziness.    MEDICATIONS  (STANDING):  heparin  Injectable 5000 Unit(s) SubCutaneous every 8 hours  influenza   Vaccine 0.5 milliLiter(s) IntraMuscular once    MEDICATIONS  (PRN):  atropine Injectable 0.5 milliGRAM(s) IV Push once PRN please keep at bedside for MD if pt has symptomatic bradycardia and decompensates.    Vital Signs Last 24 Hrs  T(C): 36.6 (16 Nov 2017 04:31), Max: 36.7 (15 Nov 2017 19:24)  T(F): 97.9 (16 Nov 2017 04:31), Max: 98 (15 Nov 2017 19:24)  HR: 67 (16 Nov 2017 07:03) (55 - 81)  BP: 129/72 (16 Nov 2017 07:03) (122/78 - 164/81)  BP(mean): --  RR: 18 (16 Nov 2017 04:31) (15 - 18)  SpO2: 95% (16 Nov 2017 04:31) (95% - 98%)    PHYSICAL EXAM  GENERAL: NAD, well-developed  HEAD:  Atraumatic, Normocephalic  EYES: EOMI, PERRLA, conjunctiva and sclera clear  NECK: Supple, No JVD  CHEST/LUNG: Clear to auscultation bilaterally; No wheeze  HEART: Regular rate and rhythm; No murmurs, rubs, or gallops  ABDOMEN: Soft, Nontender, Nondistended; Bowel sounds present  EXTREMITIES:  2+ Peripheral Pulses, No clubbing, cyanosis, or edema  PSYCH: AAOx3  SKIN: No rashes or lesions    LABS:                         15.6   11.4  )-----------( 276      ( 16 Nov 2017 06:49 )             47.1     11-16    141  |  102  |  17  ----------------------------<  95  3.8   |  25  |  0.87    Ca    8.9      16 Nov 2017 06:49  Phos  3.4     11-16  Mg     2.2     11-16    TPro  6.4  /  Alb  4.1  /  TBili  0.9  /  DBili  x   /  AST  20  /  ALT  15  /  AlkPhos  50  11-16    PT/INR - ( 15 Nov 2017 12:24 )   PT: 11.4 sec;   INR: 1.05 ratio         PTT - ( 15 Nov 2017 12:24 )  PTT:28.1 sec    CARDIAC MARKERS ( 16 Nov 2017 09:13 )  x     / <0.01 ng/mL / 409 U/L / x     / 10.1 ng/mL  CARDIAC MARKERS ( 15 Nov 2017 12:24 )  x     / <0.01 ng/mL / x     / x     / x          Labs reviewed remarkable for  trops neg x 2, lipid panel wnl, a1c 5.7.      RADIOLOGY & ADDITIONAL TESTS:    Consultant(s) Notes Reviewed:  EP  Care Discussed with Consultants/Other Providers: EP Patient is a 77y old  Male who presents with a chief complaint of Syncope at outside office (15 Nov 2017 15:23)    SUBJECTIVE / OVERNIGHT EVENTS:  Overnight, tele with bradycardia down to 30s, asymptomatic. Pt seen at bedside denies any complaints including cp, sob, abd pain, n/v/f, dizziness.    MEDICATIONS  (STANDING):  heparin  Injectable 5000 Unit(s) SubCutaneous every 8 hours  influenza   Vaccine 0.5 milliLiter(s) IntraMuscular once    MEDICATIONS  (PRN):  atropine Injectable 0.5 milliGRAM(s) IV Push once PRN please keep at bedside for MD if pt has symptomatic bradycardia and decompensates.    Vital Signs Last 24 Hrs  T(C): 36.6 (16 Nov 2017 04:31), Max: 36.7 (15 Nov 2017 19:24)  T(F): 97.9 (16 Nov 2017 04:31), Max: 98 (15 Nov 2017 19:24)  HR: 67 (16 Nov 2017 07:03) (55 - 81)  BP: 129/72 (16 Nov 2017 07:03) (122/78 - 164/81)  BP(mean): --  RR: 18 (16 Nov 2017 04:31) (15 - 18)  SpO2: 95% (16 Nov 2017 04:31) (95% - 98%)    PHYSICAL EXAM  GENERAL: NAD, well-developed  HEAD:  Atraumatic, Normocephalic  EYES: EOMI, PERRLA, conjunctiva and sclera clear  NECK: Supple, No JVD  CHEST/LUNG: Clear to auscultation bilaterally; No wheeze  HEART: Regular rate and rhythm; No murmurs, rubs, or gallops  ABDOMEN: Soft, Nontender, Nondistended; Bowel sounds present  EXTREMITIES:  2+ Peripheral Pulses, No clubbing, cyanosis, or edema  PSYCH: AAOx3  SKIN: No rashes or lesions    LABS:                         15.6   11.4  )-----------( 276      ( 16 Nov 2017 06:49 )             47.1     11-16    141  |  102  |  17  ----------------------------<  95  3.8   |  25  |  0.87    Ca    8.9      16 Nov 2017 06:49  Phos  3.4     11-16  Mg     2.2     11-16    TPro  6.4  /  Alb  4.1  /  TBili  0.9  /  DBili  x   /  AST  20  /  ALT  15  /  AlkPhos  50  11-16    PT/INR - ( 15 Nov 2017 12:24 )   PT: 11.4 sec;   INR: 1.05 ratio         PTT - ( 15 Nov 2017 12:24 )  PTT:28.1 sec    CARDIAC MARKERS ( 16 Nov 2017 09:13 )  x     / <0.01 ng/mL / 409 U/L / x     / 10.1 ng/mL  CARDIAC MARKERS ( 15 Nov 2017 12:24 )  x     / <0.01 ng/mL / x     / x     / x          Labs reviewed remarkable for  trops neg x 2, lipid panel wnl, a1c 5.7.      RADIOLOGY & ADDITIONAL TESTS:    Consultant(s) Notes Reviewed:  EP  Care Discussed with Consultants/Other Providers: EP

## 2017-11-16 NOTE — DISCHARGE NOTE ADULT - CARE PLAN
Principal Discharge DX:	Syncope  Goal:	resolution  Instructions for follow-up, activity and diet:	You were brought into the hospital because you had fainted at the doctor's office where you were found to have a very slow heart rate. Because of this, you had a pacemaker placed. Please follow up with the electrophysiologist within 1 week of leaving the hospital  Secondary Diagnosis:	Bradycardia  Goal:	Treatment  Instructions for follow-up, activity and diet:	You were brought into the hospital because you had fainted and were found to have a very slow heart rate. Due to this, you had a pacemaker placed during this admission. Please follow up with the electrophysiologist within 1 week of leaving the hospital.  Secondary Diagnosis:	HTN (hypertension)  Instructions for follow-up, activity and diet:	During this admission, your blood pressure medications were held because of a low heart rate and normal blood pressures. Please follow up with your primary care physician within 1 week of leaving the hospital prior to restarting this medications. Principal Discharge DX:	Syncope  Goal:	resolution  Instructions for follow-up, activity and diet:	You were brought into the hospital because you had fainted at the doctor's office where you were found to have a very slow heart rate. Because of this, you had a pacemaker placed. Please follow up with the electrophysiologist within 1 week of leaving the hospital  Secondary Diagnosis:	Bradycardia  Goal:	Treatment  Instructions for follow-up, activity and diet:	You were brought into the hospital because you had fainted and were found to have a very slow heart rate. Due to this, you had a pacemaker placed during this admission. Please follow up with the electrophysiologist within 1 week of leaving the hospital.  Secondary Diagnosis:	HTN (hypertension)  Instructions for follow-up, activity and diet:	During this admission, your blood pressure medications were held because of a low heart rate and normal blood pressures. You will be sent on a new blood pressure medication. Please follow up with your primary care physician within 1 week for medication adjustment.

## 2017-11-16 NOTE — DISCHARGE NOTE ADULT - MEDICATION SUMMARY - MEDICATIONS TO CHANGE
I will SWITCH the dose or number of times a day I take the medications listed below when I get home from the hospital:    amlodipine-benazepril 5 mg-20 mg oral capsule  -- 1 cap(s) by mouth once a day

## 2017-11-16 NOTE — PROGRESS NOTE ADULT - PROBLEM SELECTOR PLAN 1
- 2/2 bradycardia in the setting of found first degree heart block - not on any AV charles blocking agents, no prior episodes in recent history   - witnessed, alert immediately after awaking, no reported tonic-clonic episodes and no reported head trauma  - no infectious complaints that may have triggered it  - check stress echo to eval heart function  - check tsh, a1c, lipid panel - 2/2 bradycardia in the setting of found first degree heart block - not on any AV charles blocking agents, no prior episodes in recent history   - witnessed, alert immediately after awaking, no reported tonic-clonic episodes and no reported head trauma  - no infectious complaints that may have triggered it  - stress test today  - tsh (f/u), a1c 5.7, lipid panel wnl (no statin rec for age>75 per ascvd)

## 2017-11-16 NOTE — DISCHARGE NOTE ADULT - MEDICATION SUMMARY - MEDICATIONS TO TAKE
I will START or STAY ON the medications listed below when I get home from the hospital:    beet powder  -- 1 teaspoonful mixed with glass of water  by mouth once a day  -- Indication: For Preventive measure    aspirin 81 mg oral tablet  -- 1 tab(s) by mouth once a day  -- Indication: For CAD prophylaxis    triamterene-hydrochlorothiazide 37.5 mg-25 mg oral tablet  -- 1 tab(s) by mouth once a day  -- Indication: For HTN (hypertension)

## 2017-11-16 NOTE — DISCHARGE NOTE ADULT - CARE PROVIDER_API CALL
Ryan King), Cardiac Electrophysiology; Cardiology; Internal Medicine  [address] [place] [address]  Phone: [phone]    Sky Villegas), Internal Medicine  [address] [place] [id number]  Phone: [phone]  Fax: [phone]

## 2017-11-16 NOTE — DISCHARGE NOTE ADULT - HOSPITAL COURSE
77 year old male with PMH of HTN presents from [facility] for syncope with found bradycardia and 1st degree heart block. In the ED: Afebrile, VSS with intermittent episodes of bradycardia and 1st degree block with found RBBB. Work up as follows: CBC: white count 10/6, CMP WNL, BNP WNL, negative troponin, CXR unremarkable. TTE revealed: calcified aortic valve, minimal AR, hyperdynamic LV with reduced LV compliance. Enlarged RV with normal function. EP called for Pacemaker placement. 77 year old male with PMH of HTN presents from [facility] for syncope with found bradycardia and 1st degree heart block. In the ED: Afebrile, VSS with intermittent episodes of bradycardia and 1st degree block with found RBBB. Work up as follows: CBC: white count 10/6, CMP WNL, BNP WNL, negative troponin, CXR unremarkable. TTE revealed: calcified aortic valve, minimal AR, hyperdynamic LV with reduced LV compliance. Enlarged RV with normal function. EP called for Pacemaker placement with dual chamber pacemaker placed. CXR confirmed placement with no evidence of Pneumothorax. Patient given keflex gram load and will be discharged on 5 day course of antibiotics 77 year old male with PMH of HTN presents from [facility] for syncope with found bradycardia and 1st degree heart block. In the ED: Afebrile, VSS with intermittent episodes of bradycardia and 1st degree block with found RBBB. Work up as follows: CBC: white count 10.6, CMP WNL, BNP WNL, negative troponin, CXR unremarkable. TTE revealed: calcified aortic valve, minimal AR, hyperdynamic LV with reduced LV compliance. Enlarged RV with normal function. EP called for Pacemaker placement with dual chamber pacemaker placed. CXR confirmed placement with no evidence of Pneumothorax. Patient given keflex gram load and will be discharged on 5 day course of antibiotics.     Currently medically stable for discharge. Will need to follow up with Dr. King from Electrophysiology within 1 week and will need to follow up PCP regarding blood pressure medication management.

## 2017-11-16 NOTE — PROGRESS NOTE ADULT - PROBLEM SELECTOR PLAN 1
-With significant Bradycardia/ pause 3.3 seconds   -Monitor on telemetry, NPO after midnight for pacemaker on 11/17  -F/U transthoracic echocardiogram and Stress Test to evaluate for ischemic cause of sinus node dysfunction     [place] [id number]

## 2017-11-17 LAB
ALBUMIN SERPL ELPH-MCNC: 3.8 G/DL — SIGNIFICANT CHANGE UP (ref 3.3–5)
ALP SERPL-CCNC: 46 U/L — SIGNIFICANT CHANGE UP (ref 40–120)
ALT FLD-CCNC: 17 U/L RC — SIGNIFICANT CHANGE UP (ref 10–45)
ANION GAP SERPL CALC-SCNC: 11 MMOL/L — SIGNIFICANT CHANGE UP (ref 5–17)
AST SERPL-CCNC: 26 U/L — SIGNIFICANT CHANGE UP (ref 10–40)
BILIRUB SERPL-MCNC: 0.7 MG/DL — SIGNIFICANT CHANGE UP (ref 0.2–1.2)
BUN SERPL-MCNC: 21 MG/DL — SIGNIFICANT CHANGE UP (ref 7–23)
CALCIUM SERPL-MCNC: 8.8 MG/DL — SIGNIFICANT CHANGE UP (ref 8.4–10.5)
CHLORIDE SERPL-SCNC: 105 MMOL/L — SIGNIFICANT CHANGE UP (ref 96–108)
CO2 SERPL-SCNC: 26 MMOL/L — SIGNIFICANT CHANGE UP (ref 22–31)
CREAT SERPL-MCNC: 0.89 MG/DL — SIGNIFICANT CHANGE UP (ref 0.5–1.3)
GLUCOSE SERPL-MCNC: 87 MG/DL — SIGNIFICANT CHANGE UP (ref 70–99)
HCT VFR BLD CALC: 45 % — SIGNIFICANT CHANGE UP (ref 39–50)
HGB BLD-MCNC: 14.7 G/DL — SIGNIFICANT CHANGE UP (ref 13–17)
MAGNESIUM SERPL-MCNC: 2.1 MG/DL — SIGNIFICANT CHANGE UP (ref 1.6–2.6)
MCHC RBC-ENTMCNC: 30 PG — SIGNIFICANT CHANGE UP (ref 27–34)
MCHC RBC-ENTMCNC: 32.8 GM/DL — SIGNIFICANT CHANGE UP (ref 32–36)
MCV RBC AUTO: 91.6 FL — SIGNIFICANT CHANGE UP (ref 80–100)
PHOSPHATE SERPL-MCNC: 3.2 MG/DL — SIGNIFICANT CHANGE UP (ref 2.5–4.5)
PLATELET # BLD AUTO: 248 K/UL — SIGNIFICANT CHANGE UP (ref 150–400)
POTASSIUM SERPL-MCNC: 4 MMOL/L — SIGNIFICANT CHANGE UP (ref 3.5–5.3)
POTASSIUM SERPL-SCNC: 4 MMOL/L — SIGNIFICANT CHANGE UP (ref 3.5–5.3)
PROT SERPL-MCNC: 5.7 G/DL — LOW (ref 6–8.3)
RBC # BLD: 4.91 M/UL — SIGNIFICANT CHANGE UP (ref 4.2–5.8)
RBC # FLD: 11.8 % — SIGNIFICANT CHANGE UP (ref 10.3–14.5)
SODIUM SERPL-SCNC: 142 MMOL/L — SIGNIFICANT CHANGE UP (ref 135–145)
WBC # BLD: 8.6 K/UL — SIGNIFICANT CHANGE UP (ref 3.8–10.5)
WBC # FLD AUTO: 8.6 K/UL — SIGNIFICANT CHANGE UP (ref 3.8–10.5)

## 2017-11-17 PROCEDURE — 99152 MOD SED SAME PHYS/QHP 5/>YRS: CPT

## 2017-11-17 PROCEDURE — 93010 ELECTROCARDIOGRAM REPORT: CPT

## 2017-11-17 PROCEDURE — 71010: CPT | Mod: 26

## 2017-11-17 PROCEDURE — 33208 INSRT HEART PM ATRIAL & VENT: CPT

## 2017-11-17 PROCEDURE — 99233 SBSQ HOSP IP/OBS HIGH 50: CPT | Mod: GC

## 2017-11-17 RX ORDER — AMLODIPINE BESYLATE 2.5 MG/1
5 TABLET ORAL DAILY
Qty: 0 | Refills: 0 | Status: DISCONTINUED | OUTPATIENT
Start: 2017-11-17 | End: 2017-11-18

## 2017-11-17 RX ORDER — CEFAZOLIN SODIUM 1 G
1000 VIAL (EA) INJECTION EVERY 8 HOURS
Qty: 0 | Refills: 0 | Status: COMPLETED | OUTPATIENT
Start: 2017-11-17 | End: 2017-11-18

## 2017-11-17 RX ORDER — ACETAMINOPHEN 500 MG
650 TABLET ORAL EVERY 6 HOURS
Qty: 0 | Refills: 0 | Status: DISCONTINUED | OUTPATIENT
Start: 2017-11-17 | End: 2017-11-18

## 2017-11-17 RX ADMIN — AMLODIPINE BESYLATE 5 MILLIGRAM(S): 2.5 TABLET ORAL at 14:21

## 2017-11-17 RX ADMIN — Medication 100 MILLIGRAM(S): at 17:18

## 2017-11-17 RX ADMIN — HEPARIN SODIUM 5000 UNIT(S): 5000 INJECTION INTRAVENOUS; SUBCUTANEOUS at 21:18

## 2017-11-17 NOTE — PROGRESS NOTE ADULT - PROBLEM SELECTOR PLAN 3
- at home on triamterene- HCTZ and amlodipine-benazepril   - will continue to monitor, off of any hypertensive - - pressures currently within range - at home on triamterene- HCTZ and amlodipine-benazepril, BP borderline  - restarted amlodipine 5 mg po qd

## 2017-11-17 NOTE — PROGRESS NOTE ADULT - SUBJECTIVE AND OBJECTIVE BOX
EPS NP Note:    Post procedure pacemaker teachings reinforced with patient and family  ID, booklet, discharge instructions provided  F/U wound check appt given  Ancef 1 Gm IVSS q8H for 2 doses and discharge on Keflex 500mg bid q12H for 5days  Above d/w medicine team

## 2017-11-17 NOTE — PROGRESS NOTE ADULT - ATTENDING COMMENTS
Agree with above NP note. Severe sinus bradycardia to 20s with bifascicular block while in PMDs office with episode of syncope. WIll plan for DC PPM following TTE and stress testing.  - NPO after MN for PPM tomorrow.
Symptomatic bradycardia w/ 1st deg heart block, RBBB and syncope. Tele shows ep of asymp bradycardia overnight. f/u nuclear stress test and place NPO MN for PPM tomorrow. Monitor BP off Bp meds for now as BP controlled.
Symptomatic bradycardia w/ 1st deg heart block, RBBB and syncope. Tele shows ep of asymp bradycardia w/ ep of ~4 sec pause overnight. Now s/p PPM 11/17 tolerated well, CXR w/o PTX. Ppx abx per EP.  - monitor overnight and d/c tomorrow

## 2017-11-17 NOTE — PROGRESS NOTE ADULT - ASSESSMENT
77 year old male with PMH of HTN who presented from [facility] for syncopal episode and found to have symptomatic bradycardia w/  first degree heart block pending stress test and PPM placement 11/17. 77 year old male with PMH of HTN who presented from [facility] for syncopal episode and found to have symptomatic bradycardia w/  first degree heart block now s/p PPM placement 11/17.

## 2017-11-17 NOTE — PROGRESS NOTE ADULT - PROBLEM SELECTOR PLAN 1
- 2/2 bradycardia in the setting of found first degree heart block - not on any AV charlse blocking agents, no prior episodes in recent history   - witnessed, alert immediately after awaking, no reported tonic-clonic episodes and no reported head trauma  - no infectious complaints that may have triggered it  - stress test today  - tsh (f/u), a1c 5.7, lipid panel wnl (no statin rec for age>75 per ascvd) - 2/2 bradycardia in the setting of found first degree heart block - not on any AV charles blocking agents, no prior episodes in recent history   - witnessed, alert immediately after awaking, no reported tonic-clonic episodes and no reported head trauma  - going for PPM today with EP  - no infectious complaints that may have triggered it  - tsh (f/u), a1c 5.7, lipid panel wnl (no statin rec for age>75 per ascvd) - 2/2 bradycardia in the setting of found first degree heart block and ep of pauses on telemetry  - witnessed, alert immediately after awaking, no reported tonic-clonic episodes and no reported head trauma, no infectious complaints that may have triggered it  - tsh (f/u), a1c 5.7, lipid panel wnl (no statin rec for age>75 per ascvd)  - now s/p PPM placement 11/17 - 2/2 bradycardia in the setting of found first degree heart block and ep of pauses on telemetry  - witnessed, alert immediately after awaking, no reported tonic-clonic episodes and no reported head trauma, no infectious complaints that may have triggered it  - tsh (f/u), a1c 5.7, lipid panel wnl (no statin rec for age>75 per ascvd)  - TTE ef 75%, mild LVH.  - now s/p PPM placement 11/17

## 2017-11-17 NOTE — PROGRESS NOTE ADULT - PROBLEM SELECTOR PLAN 2
- found to have symptomatic 1st degree heart block, not on any AV charles blocking agents, w/ hr in 30s  - EP consulted for evaluation for pacemaker placement - place NPO MN tonight for PPM  - keep atropine at bedside prn, keep pacer pads on  - tele - found to have symptomatic 1st degree heart block, not on any AV charles blocking agents, w/ hr in 30s  - EP consulted for evaluation for pacemaker placement - will go for PPM today   - keep atropine at bedside prn, keep pacer pads on  - on tele for on and off the floor - found to have symptomatic 1st degree heart block, not on any AV charles blocking agents, w/ hr in 30s  - EP consulted for evaluation for pacemaker placement - now s/p PPM placement 11/17  - C/W ppx abx as per EP - ancef x 2, and then keflex 500 bid x 5 days

## 2017-11-17 NOTE — PROGRESS NOTE ADULT - SUBJECTIVE AND OBJECTIVE BOX
INTERVAL HPI/OVERNIGHT EVENTS:  resting comfortably in bed; family at bedside    MEDICATIONS  (STANDING):  ceFAZolin   IVPB 1000 milliGRAM(s) IV Intermittent every 8 hours  heparin  Injectable 5000 Unit(s) SubCutaneous every 8 hours  influenza   Vaccine 0.5 milliLiter(s) IntraMuscular once    MEDICATIONS  (PRN):  acetaminophen   Tablet. 650 milliGRAM(s) Oral every 6 hours PRN Mild Pain (1 - 3)  atropine Injectable 0.5 milliGRAM(s) IV Push once PRN please keep at bedside for MD if pt has symptomatic bradycardia and decompensates.      Allergies:  No Known Allergies      ROS:  General: Pt denies fever/chills  Neurological: denies dizziness/headache  HEENT: denies visual changes/diplopia  Cardiovascular: denies chest pain/palpitations  Respiratory and Thorax: denies SOB  Gastrointestinal: denies abdominal pain/nausea/vomiting  Genitourinary: denies dysuria  Musculoskeletal: denies incisional/surgical pain  Skin: denies rashes/sores      Vital Signs Last 24 Hrs  T(C): 36.3 (17 Nov 2017 11:15), Max: 36.7 (17 Nov 2017 04:25)  T(F): 97.3 (17 Nov 2017 11:15), Max: 98.1 (17 Nov 2017 04:25)  HR: 60 (17 Nov 2017 11:15) (50 - 75)  BP: 152/72 (17 Nov 2017 11:15) (128/69 - 152/72)  BP(mean): --  RR: 17 (17 Nov 2017 11:15) (16 - 18)  SpO2: 94% (17 Nov 2017 11:15) (94% - 97%)  Physical Exam:  Vital Signs : /72   HR60   RR17    Constitutional: well developed,  no acute distress  Neurological: Alert & Oriented x 3, NICOLAS, no focal deficits  HEENT:  PERRLA, Neck supple.  Respiratory: CTA B/L, No wheezing/crackles/rhonchi  Cardiovascular: (+) S1 & S2, RRR, No JVD  Gastrointestinal: soft, NT, nondistended, (+) BS  Genitourinary: non distended bladder, voiding freely  Extremities: No pedal edema, No clubbing, No cyanosis  Skin:  normal skin color and pigmentation, no skin lesions; left infraclavicular surgical dressing C/D/I, no swelling or active bleeding notes      LABS:                        14.7   8.6   )-----------( 248      ( 17 Nov 2017 06:06 )             45.0     11-17    142  |  105  |  21  ----------------------------<  87  4.0   |  26  |  0.89    Ca    8.8      17 Nov 2017 06:06  Phos  3.2     11-17  Mg     2.1     11-17    TPro  5.7<L>  /  Alb  3.8  /  TBili  0.7  /  DBili  x   /  AST  26  /  ALT  17  /  AlkPhos  46  11-17    PT/INR - ( 15 Nov 2017 12:24 )   PT: 11.4 sec;   INR: 1.05 ratio         PTT - ( 15 Nov 2017 12:24 )  PTT:28.1 sec      RADIOLOGY & ADDITIONAL TESTS:    TELE: SB/NSR 50-60's    CXR: pending

## 2017-11-17 NOTE — PROGRESS NOTE ADULT - SUBJECTIVE AND OBJECTIVE BOX
Patient is a 77y old  Male who presents with a chief complaint of Syncope at outside office (16 Nov 2017 21:14)        SUBJECTIVE / OVERNIGHT EVENTS:      MEDICATIONS  (STANDING):  heparin  Injectable 5000 Unit(s) SubCutaneous every 8 hours  influenza   Vaccine 0.5 milliLiter(s) IntraMuscular once    MEDICATIONS  (PRN):  atropine Injectable 0.5 milliGRAM(s) IV Push once PRN please keep at bedside for MD if pt has symptomatic bradycardia and decompensates.        CAPILLARY BLOOD GLUCOSE        I&O's Summary    16 Nov 2017 07:01  -  17 Nov 2017 06:53  --------------------------------------------------------  IN: 480 mL / OUT: 0 mL / NET: 480 mL        PHYSICAL EXAM  GENERAL: NAD, well-developed  HEAD:  Atraumatic, Normocephalic  EYES: EOMI, PERRLA, conjunctiva and sclera clear  NECK: Supple, No JVD  CHEST/LUNG: Clear to auscultation bilaterally; No wheeze  HEART: Regular rate and rhythm; No murmurs, rubs, or gallops  ABDOMEN: Soft, Nontender, Nondistended; Bowel sounds present  EXTREMITIES:  2+ Peripheral Pulses, No clubbing, cyanosis, or edema  PSYCH: AAOx3  SKIN: No rashes or lesions    LABS:                        14.7   8.6   )-----------( 248      ( 17 Nov 2017 06:06 )             45.0     11-17    142  |  105  |  21  ----------------------------<  87  4.0   |  26  |  0.89    Ca    8.8      17 Nov 2017 06:06  Phos  3.2     11-17  Mg     2.1     11-17    TPro  5.7<L>  /  Alb  3.8  /  TBili  0.7  /  DBili  x   /  AST  26  /  ALT  17  /  AlkPhos  46  11-17    PT/INR - ( 15 Nov 2017 12:24 )   PT: 11.4 sec;   INR: 1.05 ratio         PTT - ( 15 Nov 2017 12:24 )  PTT:28.1 sec  CARDIAC MARKERS ( 16 Nov 2017 09:13 )  x     / <0.01 ng/mL / 409 U/L / x     / 10.1 ng/mL  CARDIAC MARKERS ( 15 Nov 2017 12:24 )  x     / <0.01 ng/mL / x     / x     / x              RADIOLOGY & ADDITIONAL TESTS:    Imaging Personally Reviewed:  Consultant(s) Notes Reviewed:    Care Discussed with Consultants/Other Providers: Patient is a 77y old  Male who presents with a chief complaint of Syncope at outside office (16 Nov 2017 21:14)    SUBJECTIVE / OVERNIGHT EVENTS:  No acute events overnight, will go for PPM placement today with EP. Seen at bedside, reports no issues or concerns at this time.     MEDICATIONS  (STANDING):  heparin  Injectable 5000 Unit(s) SubCutaneous every 8 hours  influenza   Vaccine 0.5 milliLiter(s) IntraMuscular once    MEDICATIONS  (PRN):  atropine Injectable 0.5 milliGRAM(s) IV Push once PRN please keep at bedside for MD if pt has symptomatic bradycardia and decompensates.    Vital Signs Last 24 Hrs  T(C): 36.7 (17 Nov 2017 04:25), Max: 36.7 (17 Nov 2017 04:25)  T(F): 98.1 (17 Nov 2017 04:25), Max: 98.1 (17 Nov 2017 04:25)  HR: 50 (17 Nov 2017 04:25) (50 - 75)  BP: 128/69 (17 Nov 2017 04:25) (128/69 - 149/67)  BP(mean): --  RR: 18 (17 Nov 2017 04:25) (16 - 18)  SpO2: 97% (17 Nov 2017 04:25) (97% - 97%)    I&O's Summary    16 Nov 2017 07:01  -  17 Nov 2017 06:53  --------------------------------------------------------  IN: 480 mL / OUT: 0 mL / NET: 480 mL    PHYSICAL EXAM  GENERAL: NAD, well-developed  HEAD:  Atraumatic, Normocephalic  EYES: EOMI, PERRLA, conjunctiva and sclera clear  NECK: Supple, No JVD  CHEST/LUNG: Clear to auscultation bilaterally; No wheezes, rales or rhonchi  HEART: bradycardic with intermittent irregular rhythm. Normal S1 and S2, no murmurs rubs or gallops  ABDOMEN: Soft, Nontender, Nondistended; Bowel sounds present, no organomegaly   EXTREMITIES:  2+ Peripheral Pulses, No clubbing, cyanosis, or edema  PSYCH: AAOx3  Skin: On temple note of 2 x 2 cm waxy and dark elevated lesion with abutting elevated salmon colored elevated lesion w/o TTP. r cheek w/ seborrheic keratosis.    LABS:                        14.7   8.6   )-----------( 248      ( 17 Nov 2017 06:06 )             45.0     11-17    142  |  105  |  21  ----------------------------<  87  4.0   |  26  |  0.89    Ca    8.8      17 Nov 2017 06:06  Phos  3.2     11-17  Mg     2.1     11-17    TPro  5.7<L>  /  Alb  3.8  /  TBili  0.7  /  DBili  x   /  AST  26  /  ALT  17  /  AlkPhos  46  11-17    PT/INR - ( 15 Nov 2017 12:24 )   PT: 11.4 sec;   INR: 1.05 ratio         PTT - ( 15 Nov 2017 12:24 )  PTT:28.1 sec  CARDIAC MARKERS ( 16 Nov 2017 09:13 )  x     / <0.01 ng/mL / 409 U/L / x     / 10.1 ng/mL  CARDIAC MARKERS ( 15 Nov 2017 12:24 )  x     / <0.01 ng/mL / x     / x     / x        RADIOLOGY & ADDITIONAL TESTS:  TTE:  1. Aortic valve not well visualized; appears calcified.  Peak transaortic valve gradient equals 13 mm Hg, mean  transaortic valve gradient equals 7 mm Hg. Minimal aortic  regurgitation.  2. Hyperdynamic left ventricular systolic function.  3. Reversal of the E-A waves of the mitral inflow pattern  consistent with reduced compliance of the left ventricle.  4. Right ventricular enlargement with normal right  ventricular systolic function.  5. No pericardial effusion seen.  *** No previous Echo exam.    Imaging Personally Reviewed:  Consultant(s) Notes Reviewed:    Care Discussed with Consultants/Other Providers: Patient is a 77y old  Male who presents with a chief complaint of Syncope at outside office (16 Nov 2017 21:14)    SUBJECTIVE / OVERNIGHT EVENTS:  Overnight on tele w/ ep of pauses, asymptomatic. Seen at bedside after PPM placement, tolerated well w/o pain, w/o dizziness/cp, sob or any complaints.    MEDICATIONS  (STANDING):  amLODIPine   Tablet 5 milliGRAM(s) Oral daily  ceFAZolin   IVPB 1000 milliGRAM(s) IV Intermittent every 8 hours  heparin  Injectable 5000 Unit(s) SubCutaneous every 8 hours  influenza   Vaccine 0.5 milliLiter(s) IntraMuscular once    MEDICATIONS  (PRN):  acetaminophen   Tablet. 650 milliGRAM(s) Oral every 6 hours PRN Mild Pain (1 - 3)  atropine Injectable 0.5 milliGRAM(s) IV Push once PRN please keep at bedside for MD if pt has symptomatic bradycardia and decompensates.    Vital Signs Last 24 Hrs  T(C): 36.7 (17 Nov 2017 04:25), Max: 36.7 (17 Nov 2017 04:25)  T(F): 98.1 (17 Nov 2017 04:25), Max: 98.1 (17 Nov 2017 04:25)  HR: 50 (17 Nov 2017 04:25) (50 - 75)  BP: 128/69 (17 Nov 2017 04:25) (128/69 - 149/67)  BP(mean): --  RR: 18 (17 Nov 2017 04:25) (16 - 18)  SpO2: 97% (17 Nov 2017 04:25) (97% - 97%)    I&O's Summary    16 Nov 2017 07:01  -  17 Nov 2017 06:53  --------------------------------------------------------  IN: 480 mL / OUT: 0 mL / NET: 480 mL    PHYSICAL EXAM  GENERAL: NAD, well-developed  HEAD:  Atraumatic, Normocephalic  EYES: EOMI, PERRLA, conjunctiva and sclera clear  NECK: Supple, No JVD  CHEST/LUNG: left sided PPM in place. Clear to auscultation bilaterally; No wheezes, rales or rhonchi  HEART: Normal S1 and S2, no murmurs rubs or gallops  ABDOMEN: Soft, Nontender, Nondistended; Bowel sounds present, no organomegaly   EXTREMITIES:  2+ Peripheral Pulses, No clubbing, cyanosis, or edema  PSYCH: AAOx3  Skin: On temple note of 2 x 2 cm waxy and dark elevated lesion with abutting elevated salmon colored elevated lesion w/o TTP. r cheek w/ seborrheic keratosis.    LABS:                        14.7   8.6   )-----------( 248      ( 17 Nov 2017 06:06 )             45.0     11-17    142  |  105  |  21  ----------------------------<  87  4.0   |  26  |  0.89    Ca    8.8      17 Nov 2017 06:06  Phos  3.2     11-17  Mg     2.1     11-17    TPro  5.7<L>  /  Alb  3.8  /  TBili  0.7  /  DBili  x   /  AST  26  /  ALT  17  /  AlkPhos  46  11-17    PT/INR - ( 15 Nov 2017 12:24 )   PT: 11.4 sec;   INR: 1.05 ratio         PTT - ( 15 Nov 2017 12:24 )  PTT:28.1 sec  CARDIAC MARKERS ( 16 Nov 2017 09:13 )  x     / <0.01 ng/mL / 409 U/L / x     / 10.1 ng/mL  CARDIAC MARKERS ( 15 Nov 2017 12:24 )  x     / <0.01 ng/mL / x     / x     / x        RADIOLOGY & ADDITIONAL TESTS:  TTE:  1. Aortic valve not well visualized; appears calcified.  Peak transaortic valve gradient equals 13 mm Hg, mean  transaortic valve gradient equals 7 mm Hg. Minimal aortic  regurgitation.  2. Hyperdynamic left ventricular systolic function.  3. Reversal of the E-A waves of the mitral inflow pattern  consistent with reduced compliance of the left ventricle.  4. Right ventricular enlargement with normal right  ventricular systolic function.  5. No pericardial effusion seen.  *** No previous Echo exam.    Consultant(s) Notes Reviewed:  EP    CXR reviewed - no PTX.

## 2017-11-17 NOTE — PROGRESS NOTE ADULT - PROBLEM SELECTOR PLAN 4
DVT PPX: hsq  No PT/OT needs    NPO after midnight for PPM placement, patient unable to get nuclear stress test as it requires AV charles blocking agent. Will touch base with cardiology but it will not impact patient getting PPM tomorrow     Emelia Neil  PGY1  [phone] DVT PPX: hsq  No PT/OT needs    Going for PPM today. Called cardiology office and email Dr. Mak as patient would like to establish care with her, awaiting to hear back.      Emelia Neil  PGY1  [phone] DVT PPX: hsq  No PT/OT needs  Called cardiology office and emailed Dr. Mak as patient would like to establish care with her, awaiting to hear back.      Emelia Neil  PGY1  [phone]

## 2017-11-17 NOTE — PROGRESS NOTE ADULT - ASSESSMENT
This is a 77 year old male with PMH of HTN who presents from outside medical office for syncope. Patient was found to have bradycardia with 1st degree heart block., 3.3 second pause and now s/p permanent pacemaker implant today.    #Bradycardia with Bifascicular Block/Syncope  s/p PPM today 11/17/2017  Ancef 1 GM IVSS q8H for 2 doses then discharge on Keflex 500mg bid for 5 days  Post procedure PPM teachings initiated with patient and family  Tylenol prn for pain management  Chest xray today r/o pneumothorax and check lead placement

## 2017-11-18 VITALS
RESPIRATION RATE: 18 BRPM | TEMPERATURE: 98 F | SYSTOLIC BLOOD PRESSURE: 141 MMHG | DIASTOLIC BLOOD PRESSURE: 66 MMHG | HEART RATE: 60 BPM | OXYGEN SATURATION: 98 %

## 2017-11-18 PROBLEM — Z00.00 ENCOUNTER FOR PREVENTIVE HEALTH EXAMINATION: Status: ACTIVE | Noted: 2017-11-18

## 2017-11-18 LAB
ANION GAP SERPL CALC-SCNC: 13 MMOL/L — SIGNIFICANT CHANGE UP (ref 5–17)
BASOPHILS # BLD AUTO: 0 K/UL — SIGNIFICANT CHANGE UP (ref 0–0.2)
BASOPHILS NFR BLD AUTO: 0.1 % — SIGNIFICANT CHANGE UP (ref 0–2)
BUN SERPL-MCNC: 19 MG/DL — SIGNIFICANT CHANGE UP (ref 7–23)
CALCIUM SERPL-MCNC: 8.5 MG/DL — SIGNIFICANT CHANGE UP (ref 8.4–10.5)
CHLORIDE SERPL-SCNC: 102 MMOL/L — SIGNIFICANT CHANGE UP (ref 96–108)
CO2 SERPL-SCNC: 25 MMOL/L — SIGNIFICANT CHANGE UP (ref 22–31)
CREAT SERPL-MCNC: 0.83 MG/DL — SIGNIFICANT CHANGE UP (ref 0.5–1.3)
EOSINOPHIL # BLD AUTO: 0.2 K/UL — SIGNIFICANT CHANGE UP (ref 0–0.5)
EOSINOPHIL NFR BLD AUTO: 1.9 % — SIGNIFICANT CHANGE UP (ref 0–6)
GLUCOSE SERPL-MCNC: 106 MG/DL — HIGH (ref 70–99)
HCT VFR BLD CALC: 43.6 % — SIGNIFICANT CHANGE UP (ref 39–50)
HGB BLD-MCNC: 14.6 G/DL — SIGNIFICANT CHANGE UP (ref 13–17)
LYMPHOCYTES # BLD AUTO: 2.2 K/UL — SIGNIFICANT CHANGE UP (ref 1–3.3)
LYMPHOCYTES # BLD AUTO: 23.9 % — SIGNIFICANT CHANGE UP (ref 13–44)
MCHC RBC-ENTMCNC: 30.4 PG — SIGNIFICANT CHANGE UP (ref 27–34)
MCHC RBC-ENTMCNC: 33.5 GM/DL — SIGNIFICANT CHANGE UP (ref 32–36)
MCV RBC AUTO: 90.7 FL — SIGNIFICANT CHANGE UP (ref 80–100)
MONOCYTES # BLD AUTO: 0.7 K/UL — SIGNIFICANT CHANGE UP (ref 0–0.9)
MONOCYTES NFR BLD AUTO: 7.9 % — SIGNIFICANT CHANGE UP (ref 2–14)
NEUTROPHILS # BLD AUTO: 6.2 K/UL — SIGNIFICANT CHANGE UP (ref 1.8–7.4)
NEUTROPHILS NFR BLD AUTO: 66.3 % — SIGNIFICANT CHANGE UP (ref 43–77)
PLATELET # BLD AUTO: 250 K/UL — SIGNIFICANT CHANGE UP (ref 150–400)
POTASSIUM SERPL-MCNC: 3.7 MMOL/L — SIGNIFICANT CHANGE UP (ref 3.5–5.3)
POTASSIUM SERPL-SCNC: 3.7 MMOL/L — SIGNIFICANT CHANGE UP (ref 3.5–5.3)
RBC # BLD: 4.81 M/UL — SIGNIFICANT CHANGE UP (ref 4.2–5.8)
RBC # FLD: 12 % — SIGNIFICANT CHANGE UP (ref 10.3–14.5)
SODIUM SERPL-SCNC: 140 MMOL/L — SIGNIFICANT CHANGE UP (ref 135–145)
WBC # BLD: 9.3 K/UL — SIGNIFICANT CHANGE UP (ref 3.8–10.5)
WBC # FLD AUTO: 9.3 K/UL — SIGNIFICANT CHANGE UP (ref 3.8–10.5)

## 2017-11-18 RX ORDER — CEPHALEXIN 500 MG
1 CAPSULE ORAL
Qty: 10 | Refills: 0 | OUTPATIENT
Start: 2017-11-18 | End: 2017-11-23

## 2017-11-18 RX ORDER — AMLODIPINE BESYLATE AND BENAZEPRIL HYDROCHLORIDE 10; 20 MG/1; MG/1
1 CAPSULE ORAL
Qty: 0 | Refills: 0 | COMMUNITY

## 2017-11-18 RX ORDER — AMLODIPINE BESYLATE 2.5 MG/1
1 TABLET ORAL
Qty: 30 | Refills: 0 | OUTPATIENT
Start: 2017-11-18 | End: 2017-12-18

## 2017-11-18 RX ORDER — CEPHALEXIN 500 MG
1 CAPSULE ORAL
Qty: 0 | Refills: 0 | COMMUNITY
Start: 2017-11-18

## 2017-11-18 RX ORDER — CEPHALEXIN 500 MG
500 CAPSULE ORAL EVERY 12 HOURS
Qty: 0 | Refills: 0 | Status: DISCONTINUED | OUTPATIENT
Start: 2017-11-18 | End: 2017-11-18

## 2017-11-18 RX ORDER — TRIAMTERENE/HYDROCHLOROTHIAZID 75 MG-50MG
1 TABLET ORAL DAILY
Qty: 0 | Refills: 0 | Status: DISCONTINUED | OUTPATIENT
Start: 2017-11-18 | End: 2017-11-18

## 2017-11-18 RX ADMIN — HEPARIN SODIUM 5000 UNIT(S): 5000 INJECTION INTRAVENOUS; SUBCUTANEOUS at 06:19

## 2017-11-18 RX ADMIN — AMLODIPINE BESYLATE 5 MILLIGRAM(S): 2.5 TABLET ORAL at 06:19

## 2017-11-18 RX ADMIN — Medication 500 MILLIGRAM(S): at 10:15

## 2017-11-18 RX ADMIN — Medication 1 TABLET(S): at 10:15

## 2017-11-18 RX ADMIN — Medication 100 MILLIGRAM(S): at 00:34

## 2017-11-18 NOTE — PROGRESS NOTE ADULT - ASSESSMENT
77 year old male with PMH of HTN who presents from outside medical office for symptomatic bradycardia with episode of syncope now s/p PPM     1. Bradycardia with Bifascicular Block/Syncope  s/p PPM 11/17/2017, CXR with no PTX - official read   discharge on Keflex 500mg bid for 5 days  Post procedure PPM teachings initiated with patient and family  Tylenol prn for pain management  discharge planning     Chel Dean MD

## 2017-11-18 NOTE — PROGRESS NOTE ADULT - ASSESSMENT
77 year old male with PMH of HTN who presented from [facility] for syncopal episode and found to have symptomatic bradycardia w/  first degree heart block now s/p PPM placement 11/17.

## 2017-11-18 NOTE — PROGRESS NOTE ADULT - PROBLEM SELECTOR PLAN 2
- found to have symptomatic 1st degree heart block, not on any AV charles blocking agents, w/ hr in 30s  - EP consulted for evaluation for pacemaker placement - now s/p PPM placement 11/17  - C/W ppx abx as per EP - ancef x 2, and then keflex 500 bid x 5 days

## 2017-11-18 NOTE — PROGRESS NOTE ADULT - SUBJECTIVE AND OBJECTIVE BOX
Patient is a 77y old  Male who presents with a chief complaint of Syncope at outside office (16 Nov 2017 21:14)      SUBJECTIVE / OVERNIGHT EVENTS:  No acute events overnight. Patient has no complaints. Tolerated walking around. No events on tele.    REVIEW OF SYSTEMS:    CONSTITUTIONAL: No weakness, fevers or chills  EYES/ENT: No visual changes;  No vertigo or throat pain   NECK: No pain or stiffness  RESPIRATORY: No cough, wheezing, hemoptysis; No shortness of breath  CARDIOVASCULAR: No chest pain or palpitations  GASTROINTESTINAL: No abdominal or epigastric pain. No nausea, vomiting, or hematemesis; No diarrhea or constipation. No melena or hematochezia.  GENITOURINARY: No dysuria, frequency or hematuria  NEUROLOGICAL: No numbness or weakness  SKIN: No itching, burning, rashes, or lesions   All other review of systems is negative unless indicated above.    MEDICATIONS  (STANDING):  amLODIPine   Tablet 5 milliGRAM(s) Oral daily  cephalexin 500 milliGRAM(s) Oral every 12 hours  heparin  Injectable 5000 Unit(s) SubCutaneous every 8 hours  influenza   Vaccine 0.5 milliLiter(s) IntraMuscular once  triamterene 37.5 mG/hydrochlorothiazide 25 mG Tablet 1 Tablet(s) Oral daily    MEDICATIONS  (PRN):  acetaminophen   Tablet. 650 milliGRAM(s) Oral every 6 hours PRN Mild Pain (1 - 3)  atropine Injectable 0.5 milliGRAM(s) IV Push once PRN please keep at bedside for MD if pt has symptomatic bradycardia and decompensates.      CAPILLARY BLOOD GLUCOSE        I&O's Summary    17 Nov 2017 07:01  -  18 Nov 2017 07:00  --------------------------------------------------------  IN: 170 mL / OUT: 0 mL / NET: 170 mL        PHYSICAL EXAM:  GENERAL: NAD, laying comfortably in bed  EYES:  PERRLA, conjunctiva and sclera clear  NECK: Supple, No JVD  CHEST/LUNG: Clear to auscultation bilaterally; No wheeze  HEART: Regular rate and rhythm; No murmurs, rubs, or gallops  ABDOMEN: Soft, Nontender, Nondistended; Bowel sounds present  EXTREMITIES:  No clubbing, cyanosis, or edema  PSYCH: normal mood/affect  NEUROLOGY: AAOx3; no focal deficits  SKIN: PPM site clean/dry/intact    LABS:                        14.6   9.3   )-----------( 250      ( 18 Nov 2017 05:59 )             43.6     11-18    140  |  102  |  19  ----------------------------<  106<H>  3.7   |  25  |  0.83    Ca    8.5      18 Nov 2017 05:59  Phos  3.2     11-17  Mg     2.1     11-17    TPro  5.7<L>  /  Alb  3.8  /  TBili  0.7  /  DBili  x   /  AST  26  /  ALT  17  /  AlkPhos  46  11-17                RADIOLOGY & ADDITIONAL TESTS:    Imaging Personally Reviewed:    Consultant(s) Notes Reviewed:  EP    Care Discussed with Consultants/Other Providers:

## 2017-11-18 NOTE — PROGRESS NOTE ADULT - PROBLEM SELECTOR PLAN 1
- 2/2 bradycardia in the setting of found first degree heart block and ep of pauses on telemetry  - now s/p PPM placement 11/17 without complication

## 2017-11-18 NOTE — PROGRESS NOTE ADULT - SUBJECTIVE AND OBJECTIVE BOX
Pt seen at bedside. No CP, SOB, syncope or near syncope.     MEDICATIONS:  amLODIPine   Tablet 5 milliGRAM(s) Oral daily  heparin  Injectable 5000 Unit(s) SubCutaneous every 8 hours  triamterene 37.5 mG/hydrochlorothiazide 25 mG Tablet 1 Tablet(s) Oral daily    cephalexin 500 milliGRAM(s) Oral every 12 hours      acetaminophen   Tablet. 650 milliGRAM(s) Oral every 6 hours PRN    atropine Injectable 0.5 milliGRAM(s) IV Push once PRN      influenza   Vaccine 0.5 milliLiter(s) IntraMuscular once      REVIEW OF SYSTEMS:  General: no fatigue/malaise, weight loss/gain.  Skin: no rashes.  Ophthalmologic: no blurred vision, no loss of vision. 	  ENT: no sore throat, rhinorrhea, sinus congestion.  Respiratory: no SOB, cough or wheeze.  Gastrointestinal:  no N/V/D, no melena/hematemesis/hematochezia.  Genitourinary: no dysuria/hesitancy or hematuria.  Musculoskeletal: no myalgias or arthralgias.  Neurological: no changes in vision or hearing, no lightheadedness/dizziness, no syncope/near syncope	  Psychiatric: no unusual stress/anxiety.   Hematology/Lymphatics: no unusual bleeding, bruising and no lymphadenopathy.    All others negative except as stated above and in HPI.    PHYSICAL EXAM:  T(C): 36.8 (11-18-17 @ 04:13), Max: 37 (11-17-17 @ 21:10)  HR: 60 (11-18-17 @ 10:14) (59 - 63)  BP: 152/62 (11-18-17 @ 10:14) (145/62 - 168/72)  RR: 18 (11-18-17 @ 04:13) (16 - 18)  SpO2: 94% (11-18-17 @ 04:13) (94% - 95%)  Wt(kg): --  I&O's Summary    17 Nov 2017 07:01  -  18 Nov 2017 07:00  --------------------------------------------------------  IN: 170 mL / OUT: 0 mL / NET: 170 mL        Appearance: Normal	  HEENT:   Normal oral mucosa, PERRL, EOMI	  Lymphatic: No lymphadenopathy  Cardiovascular: Normal S1 S2, No JVD, No murmurs, No edema  Respiratory: Lungs clear to auscultation	  Psychiatry: A & O x 3, Mood & affect appropriate  Gastrointestinal:  Soft, Non-tender, + BS	  Skin: No rashes, No ecchymoses, No cyanosis	  Neurologic: Non-focal  Extremities: Normal range of motion, No clubbing, cyanosis or edema  Vascular: Peripheral pulses palpable 2+ bilaterally  PPM site: clean, intact, no hematoma, fluctuance or bleeding         LABS:	 	    CBC Full  -  ( 18 Nov 2017 05:59 )  WBC Count : 9.3 K/uL  Hemoglobin : 14.6 g/dL  Hematocrit : 43.6 %  Platelet Count - Automated : 250 K/uL  Mean Cell Volume : 90.7 fl  Mean Cell Hemoglobin : 30.4 pg  Mean Cell Hemoglobin Concentration : 33.5 gm/dL  Auto Neutrophil # : 6.2 K/uL  Auto Lymphocyte # : 2.2 K/uL  Auto Monocyte # : 0.7 K/uL  Auto Eosinophil # : 0.2 K/uL  Auto Basophil # : 0.0 K/uL  Auto Neutrophil % : 66.3 %  Auto Lymphocyte % : 23.9 %  Auto Monocyte % : 7.9 %  Auto Eosinophil % : 1.9 %  Auto Basophil % : 0.1 %    11-18    140  |  102  |  19  ----------------------------<  106<H>  3.7   |  25  |  0.83  11-17    142  |  105  |  21  ----------------------------<  87  4.0   |  26  |  0.89    Ca    8.5      18 Nov 2017 05:59  Ca    8.8      17 Nov 2017 06:06  Phos  3.2     11-17  Mg     2.1     11-17    TPro  5.7<L>  /  Alb  3.8  /  TBili  0.7  /  DBili  x   /  AST  26  /  ALT  17  /  AlkPhos  46  11-17      proBNP: Serum Pro-Brain Natriuretic Peptide: 43 pg/mL (11-15-17 @ 12:24)    tele: APaced 50 - 70s

## 2017-11-25 ENCOUNTER — TRANSCRIPTION ENCOUNTER (OUTPATIENT)
Age: 77
End: 2017-11-25

## 2017-12-14 ENCOUNTER — APPOINTMENT (OUTPATIENT)
Dept: ELECTROPHYSIOLOGY | Facility: CLINIC | Age: 77
End: 2017-12-14
Payer: MEDICARE

## 2017-12-14 ENCOUNTER — NON-APPOINTMENT (OUTPATIENT)
Age: 77
End: 2017-12-14

## 2017-12-14 VITALS
BODY MASS INDEX: 26.9 KG/M2 | HEART RATE: 68 BPM | DIASTOLIC BLOOD PRESSURE: 73 MMHG | WEIGHT: 203 LBS | SYSTOLIC BLOOD PRESSURE: 177 MMHG | HEIGHT: 73 IN | OXYGEN SATURATION: 96 %

## 2017-12-14 PROCEDURE — 99024 POSTOP FOLLOW-UP VISIT: CPT

## 2017-12-19 PROCEDURE — 84295 ASSAY OF SERUM SODIUM: CPT

## 2017-12-19 PROCEDURE — 80053 COMPREHEN METABOLIC PANEL: CPT

## 2017-12-19 PROCEDURE — 80061 LIPID PANEL: CPT

## 2017-12-19 PROCEDURE — 99153 MOD SED SAME PHYS/QHP EA: CPT

## 2017-12-19 PROCEDURE — 83880 ASSAY OF NATRIURETIC PEPTIDE: CPT

## 2017-12-19 PROCEDURE — 82803 BLOOD GASES ANY COMBINATION: CPT

## 2017-12-19 PROCEDURE — 84443 ASSAY THYROID STIM HORMONE: CPT

## 2017-12-19 PROCEDURE — C1769: CPT

## 2017-12-19 PROCEDURE — 85027 COMPLETE CBC AUTOMATED: CPT

## 2017-12-19 PROCEDURE — C1892: CPT

## 2017-12-19 PROCEDURE — 85730 THROMBOPLASTIN TIME PARTIAL: CPT

## 2017-12-19 PROCEDURE — C1898: CPT

## 2017-12-19 PROCEDURE — 82553 CREATINE MB FRACTION: CPT

## 2017-12-19 PROCEDURE — 33208 INSRT HEART PM ATRIAL & VENT: CPT | Mod: KX

## 2017-12-19 PROCEDURE — 71045 X-RAY EXAM CHEST 1 VIEW: CPT

## 2017-12-19 PROCEDURE — 82947 ASSAY GLUCOSE BLOOD QUANT: CPT

## 2017-12-19 PROCEDURE — 82330 ASSAY OF CALCIUM: CPT

## 2017-12-19 PROCEDURE — 93005 ELECTROCARDIOGRAM TRACING: CPT

## 2017-12-19 PROCEDURE — 83036 HEMOGLOBIN GLYCOSYLATED A1C: CPT

## 2017-12-19 PROCEDURE — 82550 ASSAY OF CK (CPK): CPT

## 2017-12-19 PROCEDURE — 99152 MOD SED SAME PHYS/QHP 5/>YRS: CPT

## 2017-12-19 PROCEDURE — 85014 HEMATOCRIT: CPT

## 2017-12-19 PROCEDURE — 84484 ASSAY OF TROPONIN QUANT: CPT

## 2017-12-19 PROCEDURE — 83605 ASSAY OF LACTIC ACID: CPT

## 2017-12-19 PROCEDURE — 84132 ASSAY OF SERUM POTASSIUM: CPT

## 2017-12-19 PROCEDURE — 82435 ASSAY OF BLOOD CHLORIDE: CPT

## 2017-12-19 PROCEDURE — 83735 ASSAY OF MAGNESIUM: CPT

## 2017-12-19 PROCEDURE — 85610 PROTHROMBIN TIME: CPT

## 2017-12-19 PROCEDURE — C1785: CPT

## 2017-12-19 PROCEDURE — 93306 TTE W/DOPPLER COMPLETE: CPT

## 2017-12-19 PROCEDURE — 80048 BASIC METABOLIC PNL TOTAL CA: CPT

## 2017-12-19 PROCEDURE — 83690 ASSAY OF LIPASE: CPT

## 2017-12-19 PROCEDURE — 84100 ASSAY OF PHOSPHORUS: CPT

## 2017-12-19 PROCEDURE — 99285 EMERGENCY DEPT VISIT HI MDM: CPT | Mod: 25

## 2017-12-29 ENCOUNTER — APPOINTMENT (OUTPATIENT)
Dept: DERMATOLOGY | Facility: CLINIC | Age: 77
End: 2017-12-29
Payer: MEDICARE

## 2017-12-29 ENCOUNTER — MOBILE ON CALL (OUTPATIENT)
Age: 77
End: 2017-12-29

## 2017-12-29 ENCOUNTER — LABORATORY RESULT (OUTPATIENT)
Age: 77
End: 2017-12-29

## 2017-12-29 VITALS
DIASTOLIC BLOOD PRESSURE: 72 MMHG | SYSTOLIC BLOOD PRESSURE: 140 MMHG | WEIGHT: 203 LBS | BODY MASS INDEX: 26.05 KG/M2 | HEIGHT: 74 IN

## 2017-12-29 DIAGNOSIS — B07.9 VIRAL WART, UNSPECIFIED: ICD-10-CM

## 2017-12-29 DIAGNOSIS — Z91.89 OTHER SPECIFIED PERSONAL RISK FACTORS, NOT ELSEWHERE CLASSIFIED: ICD-10-CM

## 2017-12-29 DIAGNOSIS — L72.0 EPIDERMAL CYST: ICD-10-CM

## 2017-12-29 DIAGNOSIS — L21.9 SEBORRHEIC DERMATITIS, UNSPECIFIED: ICD-10-CM

## 2017-12-29 DIAGNOSIS — D48.5 NEOPLASM OF UNCERTAIN BEHAVIOR OF SKIN: ICD-10-CM

## 2017-12-29 DIAGNOSIS — L73.8 OTHER SPECIFIED FOLLICULAR DISORDERS: ICD-10-CM

## 2017-12-29 DIAGNOSIS — Z87.898 PERSONAL HISTORY OF OTHER SPECIFIED CONDITIONS: ICD-10-CM

## 2017-12-29 PROCEDURE — 17110 DESTRUCTION B9 LES UP TO 14: CPT | Mod: GC

## 2017-12-29 PROCEDURE — 99203 OFFICE O/P NEW LOW 30 MIN: CPT | Mod: 25,GC

## 2017-12-29 PROCEDURE — 11100 BX SKIN SUBCUTANEOUS&/MUCOUS MEMBRANE 1 LESION: CPT | Mod: 59,GC

## 2017-12-29 RX ORDER — TRIAMTERENE AND HYDROCHLOROTHIAZIDE 37.5; 25 MG/1; MG/1
37.5-25 CAPSULE ORAL
Qty: 90 | Refills: 0 | Status: ACTIVE | COMMUNITY
Start: 2017-06-28

## 2017-12-29 RX ORDER — AMLODIPINE BESYLATE AND BENAZEPRIL HYDROCHLORIDE 5; 20 MG/1; MG/1
5-20 CAPSULE ORAL
Qty: 90 | Refills: 0 | Status: ACTIVE | COMMUNITY
Start: 2016-12-01

## 2018-01-01 PROBLEM — B07.9 VERRUCA VULGARIS: Status: ACTIVE | Noted: 2017-12-29

## 2018-01-01 PROBLEM — D48.5 NEOPLASM OF UNCERTAIN BEHAVIOR OF SKIN: Status: ACTIVE | Noted: 2017-12-29

## 2018-01-01 PROBLEM — L73.8 SEBACEOUS HYPERPLASIA: Status: ACTIVE | Noted: 2017-12-29

## 2018-01-01 PROBLEM — L72.0 EPIDERMAL CYST: Status: ACTIVE | Noted: 2018-01-01

## 2018-01-04 ENCOUNTER — RESULT REVIEW (OUTPATIENT)
Age: 78
End: 2018-01-04

## 2018-01-24 NOTE — H&P ADULT - NSHPREVIEWOFSYSTEMS_GEN_ALL_CORE
0 Review of Systems:   CONSTITUTIONAL: No fever, weight loss  EYES: No eye pain, visual disturbances, or discharge  ENMT:  No difficulty hearing, tinnitus, vertigo; No sinus or throat pain  RESPIRATORY: No SOB. No cough, wheezing, chills or hemoptysis  CARDIOVASCULAR: +SYNCOPE. No chest pain, palpitations, dizziness, or leg swelling  GASTROINTESTINAL: No abdominal or epigastric pain. No nausea, vomiting, or hematemesis; No diarrhea or constipation. No melena or hematochezia.  GENITOURINARY: No dysuria, frequency, hematuria, or incontinence  NEUROLOGICAL: No headaches, memory loss, loss of strength, numbness, or tremors  SKIN: R FACIAL LESION  LYMPH NODES: No enlarged glands  ENDOCRINE: No heat or cold intolerance; No hair loss  MUSCULOSKELETAL: No joint pain or swelling; No muscle, back pain  PSYCHIATRIC: No depression, anxiety, mood swings, or difficulty sleeping  HEME/LYMPH: No easy bruising, or bleeding gums

## 2018-02-09 ENCOUNTER — APPOINTMENT (OUTPATIENT)
Dept: DERMATOLOGY | Facility: CLINIC | Age: 78
End: 2018-02-09
Payer: MEDICARE

## 2018-02-09 VITALS — SYSTOLIC BLOOD PRESSURE: 134 MMHG | DIASTOLIC BLOOD PRESSURE: 82 MMHG

## 2018-02-09 PROCEDURE — 17262 DSTRJ MAL LES T/A/L 1.1-2.0: CPT | Mod: GC

## 2018-03-15 ENCOUNTER — APPOINTMENT (OUTPATIENT)
Dept: ELECTROPHYSIOLOGY | Facility: CLINIC | Age: 78
End: 2018-03-15
Payer: MEDICARE

## 2018-03-15 VITALS
OXYGEN SATURATION: 98 % | DIASTOLIC BLOOD PRESSURE: 62 MMHG | HEIGHT: 73 IN | BODY MASS INDEX: 25.84 KG/M2 | WEIGHT: 195 LBS | SYSTOLIC BLOOD PRESSURE: 155 MMHG | HEART RATE: 60 BPM

## 2018-03-15 PROCEDURE — 93280 PM DEVICE PROGR EVAL DUAL: CPT

## 2018-03-15 RX ORDER — CEPHALEXIN 500 MG/1
500 CAPSULE ORAL
Qty: 10 | Refills: 0 | Status: DISCONTINUED | COMMUNITY
Start: 2017-11-18 | End: 2018-03-15

## 2018-03-15 RX ORDER — AMLODIPINE BESYLATE 5 MG/1
5 TABLET ORAL
Qty: 30 | Refills: 0 | Status: DISCONTINUED | COMMUNITY
Start: 2017-11-18 | End: 2018-03-15

## 2018-04-06 ENCOUNTER — APPOINTMENT (OUTPATIENT)
Dept: DERMATOLOGY | Facility: CLINIC | Age: 78
End: 2018-04-06
Payer: MEDICARE

## 2018-04-06 VITALS — DIASTOLIC BLOOD PRESSURE: 62 MMHG | SYSTOLIC BLOOD PRESSURE: 126 MMHG

## 2018-04-06 DIAGNOSIS — D09.9 CARCINOMA IN SITU, UNSPECIFIED: ICD-10-CM

## 2018-04-06 DIAGNOSIS — Z12.83 ENCOUNTER FOR SCREENING FOR MALIGNANT NEOPLASM OF SKIN: ICD-10-CM

## 2018-04-06 DIAGNOSIS — Z85.828 PERSONAL HISTORY OF OTHER MALIGNANT NEOPLASM OF SKIN: ICD-10-CM

## 2018-04-06 DIAGNOSIS — L85.3 XEROSIS CUTIS: ICD-10-CM

## 2018-04-06 DIAGNOSIS — D18.01 HEMANGIOMA OF SKIN AND SUBCUTANEOUS TISSUE: ICD-10-CM

## 2018-04-06 DIAGNOSIS — L82.1 OTHER SEBORRHEIC KERATOSIS: ICD-10-CM

## 2018-04-06 PROCEDURE — 99214 OFFICE O/P EST MOD 30 MIN: CPT

## 2018-04-21 NOTE — PATIENT PROFILE ADULT. - VISION (WITH CORRECTIVE LENSES IF THE PATIENT USUALLY WEARS THEM):
Alert and oriented to person, place and time
Normal vision: sees adequately in most situations; can see medication labels, newsprint

## 2018-06-14 ENCOUNTER — APPOINTMENT (OUTPATIENT)
Dept: ELECTROPHYSIOLOGY | Facility: CLINIC | Age: 78
End: 2018-06-14
Payer: MEDICARE

## 2018-06-14 DIAGNOSIS — I49.5 SICK SINUS SYNDROME: ICD-10-CM

## 2018-06-14 PROCEDURE — 93294 REM INTERROG EVL PM/LDLS PM: CPT

## 2018-09-18 ENCOUNTER — APPOINTMENT (OUTPATIENT)
Dept: ELECTROPHYSIOLOGY | Facility: CLINIC | Age: 78
End: 2018-09-18
Payer: MEDICARE

## 2018-09-18 VITALS
HEART RATE: 57 BPM | HEIGHT: 73 IN | WEIGHT: 185 LBS | DIASTOLIC BLOOD PRESSURE: 68 MMHG | SYSTOLIC BLOOD PRESSURE: 157 MMHG | BODY MASS INDEX: 24.52 KG/M2 | OXYGEN SATURATION: 98 %

## 2018-09-18 PROCEDURE — 93280 PM DEVICE PROGR EVAL DUAL: CPT

## 2018-10-02 ENCOUNTER — APPOINTMENT (OUTPATIENT)
Dept: DERMATOLOGY | Facility: CLINIC | Age: 78
End: 2018-10-02

## 2018-12-13 ENCOUNTER — APPOINTMENT (OUTPATIENT)
Dept: ELECTROPHYSIOLOGY | Facility: CLINIC | Age: 78
End: 2018-12-13

## 2019-05-02 ENCOUNTER — APPOINTMENT (OUTPATIENT)
Dept: ELECTROPHYSIOLOGY | Facility: HOSPITAL | Age: 79
End: 2019-05-02
Payer: MEDICARE

## 2019-05-02 PROCEDURE — 93296 REM INTERROG EVL PM/IDS: CPT

## 2019-05-02 PROCEDURE — 93294 REM INTERROG EVL PM/LDLS PM: CPT

## 2019-05-15 PROBLEM — I10 ESSENTIAL (PRIMARY) HYPERTENSION: Chronic | Status: ACTIVE | Noted: 2017-11-15

## 2019-08-05 ENCOUNTER — APPOINTMENT (OUTPATIENT)
Dept: ELECTROPHYSIOLOGY | Facility: HOSPITAL | Age: 79
End: 2019-08-05
Payer: MEDICARE

## 2019-08-05 PROCEDURE — 93296 REM INTERROG EVL PM/IDS: CPT

## 2019-08-05 PROCEDURE — 93294 REM INTERROG EVL PM/LDLS PM: CPT

## 2019-11-02 NOTE — H&P ADULT - NSTOBACCOSCREENHP_GEN_A_NCS
Telemetry monitoring.  3 Sets of cardiac enzymes neg  Aspirin daily  Conitnue Plavix 75mg po qdaily.  TTE shows pulmonary HTN, otherwise unremarkable  Cardiology consult-Dr. Daniel appreciated  Pain possibly 2' to RA flare; will start prednisone 10 mg PO daily
No

## 2019-11-05 ENCOUNTER — APPOINTMENT (OUTPATIENT)
Dept: ELECTROPHYSIOLOGY | Facility: CLINIC | Age: 79
End: 2019-11-05
Payer: MEDICARE

## 2019-11-05 PROCEDURE — 93294 REM INTERROG EVL PM/LDLS PM: CPT

## 2019-11-05 PROCEDURE — 93296 REM INTERROG EVL PM/IDS: CPT

## 2019-12-31 ENCOUNTER — TRANSCRIPTION ENCOUNTER (OUTPATIENT)
Age: 79
End: 2019-12-31

## 2020-03-06 ENCOUNTER — APPOINTMENT (OUTPATIENT)
Dept: ELECTROPHYSIOLOGY | Facility: CLINIC | Age: 80
End: 2020-03-06
Payer: MEDICARE

## 2020-03-06 VITALS
HEART RATE: 69 BPM | WEIGHT: 195 LBS | OXYGEN SATURATION: 98 % | DIASTOLIC BLOOD PRESSURE: 68 MMHG | BODY MASS INDEX: 25.84 KG/M2 | SYSTOLIC BLOOD PRESSURE: 160 MMHG | HEIGHT: 73 IN

## 2020-03-06 PROCEDURE — 93280 PM DEVICE PROGR EVAL DUAL: CPT

## 2020-05-01 DIAGNOSIS — I48.91 UNSPECIFIED ATRIAL FIBRILLATION: ICD-10-CM

## 2020-05-01 RX ORDER — APIXABAN 5 MG/1
5 TABLET, FILM COATED ORAL
Qty: 60 | Refills: 2 | Status: ACTIVE | COMMUNITY
Start: 2020-05-01 | End: 1900-01-01

## 2020-06-08 ENCOUNTER — APPOINTMENT (OUTPATIENT)
Dept: ELECTROPHYSIOLOGY | Facility: CLINIC | Age: 80
End: 2020-06-08
Payer: MEDICARE

## 2020-06-08 PROCEDURE — 93294 REM INTERROG EVL PM/LDLS PM: CPT

## 2020-06-08 PROCEDURE — 93296 REM INTERROG EVL PM/IDS: CPT

## 2020-08-20 PROBLEM — I49.5 SINUS NODE DYSFUNCTION: Status: ACTIVE | Noted: 2017-12-14

## 2020-09-04 NOTE — ED ADULT NURSE NOTE - TEMPLATE
Cardiac Bi-Rhombic Flap Text: The defect edges were debeveled with a #15 scalpel blade.  Given the location of the defect and the proximity to free margins a bi-rhombic flap was deemed most appropriate.  Using a sterile surgical marker, an appropriate rhombic flap was drawn incorporating the defect. The area thus outlined was incised deep to adipose tissue with a #15 scalpel blade.  The skin margins were undermined to an appropriate distance in all directions utilizing iris scissors.

## 2020-09-08 ENCOUNTER — NON-APPOINTMENT (OUTPATIENT)
Age: 80
End: 2020-09-08

## 2020-09-08 ENCOUNTER — APPOINTMENT (OUTPATIENT)
Dept: ELECTROPHYSIOLOGY | Facility: CLINIC | Age: 80
End: 2020-09-08
Payer: MEDICARE

## 2020-09-08 VITALS
OXYGEN SATURATION: 98 % | HEIGHT: 73 IN | WEIGHT: 196 LBS | SYSTOLIC BLOOD PRESSURE: 168 MMHG | HEART RATE: 79 BPM | DIASTOLIC BLOOD PRESSURE: 71 MMHG | BODY MASS INDEX: 25.98 KG/M2

## 2020-09-08 DIAGNOSIS — I48.92 UNSPECIFIED ATRIAL FLUTTER: ICD-10-CM

## 2020-09-08 PROCEDURE — 93280 PM DEVICE PROGR EVAL DUAL: CPT

## 2020-09-09 ENCOUNTER — OUTPATIENT (OUTPATIENT)
Dept: OUTPATIENT SERVICES | Facility: HOSPITAL | Age: 80
LOS: 1 days | End: 2020-09-09
Payer: COMMERCIAL

## 2020-09-09 DIAGNOSIS — Z11.59 ENCOUNTER FOR SCREENING FOR OTHER VIRAL DISEASES: ICD-10-CM

## 2020-09-09 LAB — SARS-COV-2 RNA SPEC QL NAA+PROBE: SIGNIFICANT CHANGE UP

## 2020-09-09 PROCEDURE — U0003: CPT

## 2020-09-11 ENCOUNTER — OUTPATIENT (OUTPATIENT)
Dept: OUTPATIENT SERVICES | Facility: HOSPITAL | Age: 80
LOS: 1 days | End: 2020-09-11
Payer: COMMERCIAL

## 2020-09-11 VITALS
SYSTOLIC BLOOD PRESSURE: 154 MMHG | HEART RATE: 76 BPM | OXYGEN SATURATION: 97 % | RESPIRATION RATE: 16 BRPM | HEIGHT: 73 IN | TEMPERATURE: 98 F | DIASTOLIC BLOOD PRESSURE: 74 MMHG | WEIGHT: 195.99 LBS

## 2020-09-11 VITALS
HEART RATE: 70 BPM | RESPIRATION RATE: 18 BRPM | DIASTOLIC BLOOD PRESSURE: 63 MMHG | OXYGEN SATURATION: 93 % | SYSTOLIC BLOOD PRESSURE: 125 MMHG

## 2020-09-11 DIAGNOSIS — I48.91 UNSPECIFIED ATRIAL FIBRILLATION: ICD-10-CM

## 2020-09-11 LAB
ALBUMIN SERPL ELPH-MCNC: 4.6 G/DL — SIGNIFICANT CHANGE UP (ref 3.3–5)
ALP SERPL-CCNC: 60 U/L — SIGNIFICANT CHANGE UP (ref 40–120)
ALT FLD-CCNC: 11 U/L — SIGNIFICANT CHANGE UP (ref 10–45)
ANION GAP SERPL CALC-SCNC: 13 MMOL/L — SIGNIFICANT CHANGE UP (ref 5–17)
APTT BLD: 37.9 SEC — HIGH (ref 27.5–35.5)
AST SERPL-CCNC: 14 U/L — SIGNIFICANT CHANGE UP (ref 10–40)
BILIRUB SERPL-MCNC: 0.7 MG/DL — SIGNIFICANT CHANGE UP (ref 0.2–1.2)
BUN SERPL-MCNC: 13 MG/DL — SIGNIFICANT CHANGE UP (ref 7–23)
CALCIUM SERPL-MCNC: 9.3 MG/DL — SIGNIFICANT CHANGE UP (ref 8.4–10.5)
CHLORIDE SERPL-SCNC: 100 MMOL/L — SIGNIFICANT CHANGE UP (ref 96–108)
CO2 SERPL-SCNC: 25 MMOL/L — SIGNIFICANT CHANGE UP (ref 22–31)
CREAT SERPL-MCNC: 0.87 MG/DL — SIGNIFICANT CHANGE UP (ref 0.5–1.3)
GLUCOSE SERPL-MCNC: 113 MG/DL — HIGH (ref 70–99)
HCT VFR BLD CALC: 48.4 % — SIGNIFICANT CHANGE UP (ref 39–50)
HGB BLD-MCNC: 16.5 G/DL — SIGNIFICANT CHANGE UP (ref 13–17)
INR BLD: 1.2 RATIO — HIGH (ref 0.88–1.16)
MCHC RBC-ENTMCNC: 29.6 PG — SIGNIFICANT CHANGE UP (ref 27–34)
MCHC RBC-ENTMCNC: 34.1 GM/DL — SIGNIFICANT CHANGE UP (ref 32–36)
MCV RBC AUTO: 86.9 FL — SIGNIFICANT CHANGE UP (ref 80–100)
NRBC # BLD: 0 /100 WBCS — SIGNIFICANT CHANGE UP (ref 0–0)
PLATELET # BLD AUTO: 283 K/UL — SIGNIFICANT CHANGE UP (ref 150–400)
POTASSIUM SERPL-MCNC: 3.4 MMOL/L — LOW (ref 3.5–5.3)
POTASSIUM SERPL-SCNC: 3.4 MMOL/L — LOW (ref 3.5–5.3)
PROT SERPL-MCNC: 6.6 G/DL — SIGNIFICANT CHANGE UP (ref 6–8.3)
PROTHROM AB SERPL-ACNC: 14.2 SEC — HIGH (ref 10.6–13.6)
RBC # BLD: 5.57 M/UL — SIGNIFICANT CHANGE UP (ref 4.2–5.8)
RBC # FLD: 12.9 % — SIGNIFICANT CHANGE UP (ref 10.3–14.5)
SODIUM SERPL-SCNC: 138 MMOL/L — SIGNIFICANT CHANGE UP (ref 135–145)
WBC # BLD: 8.81 K/UL — SIGNIFICANT CHANGE UP (ref 3.8–10.5)
WBC # FLD AUTO: 8.81 K/UL — SIGNIFICANT CHANGE UP (ref 3.8–10.5)

## 2020-09-11 PROCEDURE — 85610 PROTHROMBIN TIME: CPT

## 2020-09-11 PROCEDURE — 80053 COMPREHEN METABOLIC PANEL: CPT

## 2020-09-11 PROCEDURE — 92960 CARDIOVERSION ELECTRIC EXT: CPT

## 2020-09-11 PROCEDURE — 85730 THROMBOPLASTIN TIME PARTIAL: CPT

## 2020-09-11 PROCEDURE — 93005 ELECTROCARDIOGRAM TRACING: CPT

## 2020-09-11 PROCEDURE — 93010 ELECTROCARDIOGRAM REPORT: CPT

## 2020-09-11 PROCEDURE — 85027 COMPLETE CBC AUTOMATED: CPT

## 2020-09-11 RX ORDER — SODIUM CHLORIDE 9 MG/ML
3 INJECTION INTRAMUSCULAR; INTRAVENOUS; SUBCUTANEOUS EVERY 8 HOURS
Refills: 0 | Status: DISCONTINUED | OUTPATIENT
Start: 2020-09-11 | End: 2020-09-25

## 2020-09-11 NOTE — PRE-ANESTHESIA EVALUATION ADULT - NSANTHOSAYNRD_GEN_A_CORE
No. ZARINA screening performed.  STOP BANG Legend: 0-2 = LOW Risk; 3-4 = INTERMEDIATE Risk; 5-8 = HIGH Risk

## 2020-10-12 ENCOUNTER — NON-APPOINTMENT (OUTPATIENT)
Age: 80
End: 2020-10-12

## 2020-10-12 ENCOUNTER — APPOINTMENT (OUTPATIENT)
Dept: ELECTROPHYSIOLOGY | Facility: CLINIC | Age: 80
End: 2020-10-12
Payer: MEDICARE

## 2020-10-12 VITALS
DIASTOLIC BLOOD PRESSURE: 60 MMHG | HEIGHT: 73 IN | BODY MASS INDEX: 25.18 KG/M2 | OXYGEN SATURATION: 98 % | HEART RATE: 79 BPM | WEIGHT: 190 LBS | SYSTOLIC BLOOD PRESSURE: 145 MMHG

## 2020-10-12 PROCEDURE — 93288 INTERROG EVL PM/LDLS PM IP: CPT

## 2020-10-12 PROCEDURE — 99213 OFFICE O/P EST LOW 20 MIN: CPT

## 2020-10-12 PROCEDURE — 93000 ELECTROCARDIOGRAM COMPLETE: CPT | Mod: 59

## 2020-10-12 NOTE — DISCUSSION/SUMMARY
[FreeTextEntry1] : In summary, this is an 80 year old man with SND s/p dual chamber pacemaker and paroxysmal atrial flutter on Eliquis. Options regarding management, including a rate control strategy with AV charles blocking agents, or rhythm control strategies employing anti-arrhythmic drugs and/or catheter ablation were reviewed. Given his minimal symptoms he would at this time like to continue a "wait and see" approach and continue with current therapy. I quoted him a 50% chance of arrhythmia recurrence. He will follow up in 6 months.\par \par Mr. Rodríguez appeared to understand the whole discussion and verbalized that all of his questions were answered to his satisfaction.\par \par Thank you for allowing me to be involved in the care of this pleasant man. Please feel free to contact me with any questions.\par \par \par \par Ryan King MD\par  of Cardiology\par Electrophysiology Section\par [address] [place] [address], [address] [place] [address]\[place] [address]\par Office: [phone]\par Fax: [phone]\par

## 2020-10-12 NOTE — HISTORY OF PRESENT ILLNESS
[de-identified] : Dante Rodríguez is an 80 year old man with history of sick sinus syndrome s/p dual chamber pacemaker implantation in 2017 and HTN. In May 2020 he developed persistent atrial flutter and last month was cardioverted back to an atrial paced rhythm. He presents now for follow up. He noted mild fatigue and decreased exercise tolerance while in atrial flutter, since resolved.\par \par Interrogation today shows normal pace/sense function and battery life. There are no further arrhythmic events since cardioversion.

## 2020-12-09 ENCOUNTER — APPOINTMENT (OUTPATIENT)
Dept: ELECTROPHYSIOLOGY | Facility: CLINIC | Age: 80
End: 2020-12-09
Payer: MEDICARE

## 2020-12-09 PROCEDURE — 93296 REM INTERROG EVL PM/IDS: CPT

## 2020-12-09 PROCEDURE — 93294 REM INTERROG EVL PM/LDLS PM: CPT

## 2021-01-13 ENCOUNTER — NON-APPOINTMENT (OUTPATIENT)
Age: 81
End: 2021-01-13

## 2021-01-13 ENCOUNTER — APPOINTMENT (OUTPATIENT)
Dept: ELECTROPHYSIOLOGY | Facility: CLINIC | Age: 81
End: 2021-01-13
Payer: MEDICARE

## 2021-01-13 VITALS
HEART RATE: 75 BPM | DIASTOLIC BLOOD PRESSURE: 68 MMHG | OXYGEN SATURATION: 98 % | SYSTOLIC BLOOD PRESSURE: 150 MMHG | HEIGHT: 73 IN

## 2021-01-13 PROCEDURE — 93280 PM DEVICE PROGR EVAL DUAL: CPT

## 2021-01-13 PROCEDURE — 99072 ADDL SUPL MATRL&STAF TM PHE: CPT

## 2021-04-12 ENCOUNTER — NON-APPOINTMENT (OUTPATIENT)
Age: 81
End: 2021-04-12

## 2021-04-12 ENCOUNTER — APPOINTMENT (OUTPATIENT)
Dept: ELECTROPHYSIOLOGY | Facility: CLINIC | Age: 81
End: 2021-04-12
Payer: MEDICARE

## 2021-04-12 VITALS
BODY MASS INDEX: 24.92 KG/M2 | SYSTOLIC BLOOD PRESSURE: 136 MMHG | WEIGHT: 188 LBS | HEART RATE: 76 BPM | OXYGEN SATURATION: 97 % | DIASTOLIC BLOOD PRESSURE: 63 MMHG | HEIGHT: 73 IN

## 2021-04-12 PROCEDURE — 93000 ELECTROCARDIOGRAM COMPLETE: CPT | Mod: 59

## 2021-04-12 PROCEDURE — 93288 INTERROG EVL PM/LDLS PM IP: CPT

## 2021-04-12 PROCEDURE — 99072 ADDL SUPL MATRL&STAF TM PHE: CPT

## 2021-04-12 PROCEDURE — 99213 OFFICE O/P EST LOW 20 MIN: CPT | Mod: 25

## 2021-04-12 PROCEDURE — 99213 OFFICE O/P EST LOW 20 MIN: CPT

## 2021-04-12 NOTE — DISCUSSION/SUMMARY
[FreeTextEntry1] : In summary, this is an 80 year old man with SND s/p dual chamber pacemaker and paroxysmal atrial flutter on Eliquis. Options regarding management, including a rate control strategy with AV charles blocking agents, or rhythm control strategies employing anti-arrhythmic drugs and/or catheter ablation were reviewed. Given his minimal symptoms he would at this time like to continue a "wait and see" approach and continue with current therapy. I quoted him a 50% chance of arrhythmia recurrence.\par \par His elevated RV pacing threshold and impedance is not an urgent matter and is likely secondary to benign calcification of the lead. Programming adjustments made to minimize requirement for RV pacing. We briefly discussed the implantation of an additional RV lead at the time of his next generator change should his pacing requirements increase overtime. \par \par Mr. Rodríguez appeared to understand the whole discussion and verbalized that all of his questions were answered to his satisfaction.\par \par Thank you for allowing me to be involved in the care of this pleasant man. Please feel free to contact me with any questions.\par \par \par Ryan King MD\par  of Cardiology\par Electrophysiology Section\par [address] [place] [address], [address] [place] [address]\[place] [address]\par Office: [phone]\par Fax: [phone]\par

## 2021-04-12 NOTE — HISTORY OF PRESENT ILLNESS
[de-identified] : Dante Rodríguez is an 81 year old man with history of sick sinus syndrome s/p dual chamber pacemaker implantation in 2017 and HTN. In May 2020 he developed persistent atrial flutter and was cardioverted back to an atrial paced rhythm in 9/2020. Subsequently in 1/2021, an alert came through via remote monitoring for elevated but stable lead impedance in conjunction with elevated RV pacing threshold. He presents today for routine follow up. He noted mild fatigue and decreased exercise tolerance while in atrial flutter has since resolved. He is now feeling generally well without complaints. \par \par Interrogation today shows normal function with adequate battery life of 5.5 years. RV threshold has increased to 2.6V @0.5ms with RV pace impedance progressively uptrending but stable at 1283 ohms. He is Vpaced only 12% at the LRL after blocked PACs triggers a RYTHMIQ episode. LRL decreased to 50bpm and AV Search increased from 350 to 400ms to minimize his requirement for RV pacing. Stored data also revealed few episodes of atrial tachycardia with 1:1 conduction lasting <1 minute for which he is asymptomatic of. \par \par Denies chest pain, shortness of breath, lightheadedness, dizziness, or feelings of near syncope.

## 2021-06-01 ENCOUNTER — NON-APPOINTMENT (OUTPATIENT)
Age: 81
End: 2021-06-01

## 2021-06-01 ENCOUNTER — APPOINTMENT (OUTPATIENT)
Dept: ELECTROPHYSIOLOGY | Facility: CLINIC | Age: 81
End: 2021-06-01
Payer: MEDICARE

## 2021-06-01 PROCEDURE — 93296 REM INTERROG EVL PM/IDS: CPT

## 2021-06-01 PROCEDURE — 93294 REM INTERROG EVL PM/LDLS PM: CPT

## 2021-09-01 ENCOUNTER — NON-APPOINTMENT (OUTPATIENT)
Age: 81
End: 2021-09-01

## 2021-09-01 ENCOUNTER — APPOINTMENT (OUTPATIENT)
Dept: ELECTROPHYSIOLOGY | Facility: CLINIC | Age: 81
End: 2021-09-01
Payer: MEDICARE

## 2021-09-01 PROCEDURE — 93294 REM INTERROG EVL PM/LDLS PM: CPT

## 2021-09-01 PROCEDURE — 93296 REM INTERROG EVL PM/IDS: CPT

## 2021-10-12 ENCOUNTER — APPOINTMENT (OUTPATIENT)
Dept: ELECTROPHYSIOLOGY | Facility: CLINIC | Age: 81
End: 2021-10-12
Payer: MEDICARE

## 2021-10-12 ENCOUNTER — NON-APPOINTMENT (OUTPATIENT)
Age: 81
End: 2021-10-12

## 2021-10-12 VITALS — HEART RATE: 74 BPM | DIASTOLIC BLOOD PRESSURE: 64 MMHG | SYSTOLIC BLOOD PRESSURE: 163 MMHG

## 2021-10-12 VITALS — DIASTOLIC BLOOD PRESSURE: 65 MMHG | SYSTOLIC BLOOD PRESSURE: 155 MMHG

## 2021-10-12 PROCEDURE — 93000 ELECTROCARDIOGRAM COMPLETE: CPT | Mod: 59

## 2021-10-12 PROCEDURE — 93280 PM DEVICE PROGR EVAL DUAL: CPT

## 2021-12-01 ENCOUNTER — NON-APPOINTMENT (OUTPATIENT)
Age: 81
End: 2021-12-01

## 2021-12-01 ENCOUNTER — APPOINTMENT (OUTPATIENT)
Dept: ELECTROPHYSIOLOGY | Facility: CLINIC | Age: 81
End: 2021-12-01
Payer: MEDICARE

## 2021-12-01 PROCEDURE — 93294 REM INTERROG EVL PM/LDLS PM: CPT | Mod: NC

## 2021-12-01 PROCEDURE — 93296 REM INTERROG EVL PM/IDS: CPT | Mod: NC

## 2022-02-11 ENCOUNTER — EMERGENCY (EMERGENCY)
Facility: HOSPITAL | Age: 82
LOS: 1 days | Discharge: HOME | End: 2022-02-11
Attending: EMERGENCY MEDICINE
Payer: COMMERCIAL

## 2022-02-11 VITALS
DIASTOLIC BLOOD PRESSURE: 98 MMHG | OXYGEN SATURATION: 100 % | HEART RATE: 50 BPM | WEIGHT: 184.97 LBS | HEIGHT: 73 IN | SYSTOLIC BLOOD PRESSURE: 129 MMHG | TEMPERATURE: 98 F | RESPIRATION RATE: 16 BRPM

## 2022-02-11 VITALS
OXYGEN SATURATION: 97 % | RESPIRATION RATE: 16 BRPM | SYSTOLIC BLOOD PRESSURE: 129 MMHG | HEART RATE: 66 BPM | DIASTOLIC BLOOD PRESSURE: 51 MMHG | TEMPERATURE: 98 F

## 2022-02-11 LAB
ALBUMIN SERPL ELPH-MCNC: 4 G/DL — SIGNIFICANT CHANGE UP (ref 3.3–5)
ALP SERPL-CCNC: 61 U/L — SIGNIFICANT CHANGE UP (ref 40–120)
ALT FLD-CCNC: 11 U/L — SIGNIFICANT CHANGE UP (ref 10–45)
ANION GAP SERPL CALC-SCNC: 12 MMOL/L — SIGNIFICANT CHANGE UP (ref 5–17)
AST SERPL-CCNC: 10 U/L — SIGNIFICANT CHANGE UP (ref 10–40)
BASOPHILS # BLD AUTO: 0.03 K/UL — SIGNIFICANT CHANGE UP (ref 0–0.2)
BASOPHILS NFR BLD AUTO: 0.2 % — SIGNIFICANT CHANGE UP (ref 0–2)
BILIRUB SERPL-MCNC: 0.4 MG/DL — SIGNIFICANT CHANGE UP (ref 0.2–1.2)
BUN SERPL-MCNC: 16 MG/DL — SIGNIFICANT CHANGE UP (ref 7–23)
CALCIUM SERPL-MCNC: 8.8 MG/DL — SIGNIFICANT CHANGE UP (ref 8.4–10.5)
CHLORIDE SERPL-SCNC: 103 MMOL/L — SIGNIFICANT CHANGE UP (ref 96–108)
CO2 SERPL-SCNC: 23 MMOL/L — SIGNIFICANT CHANGE UP (ref 22–31)
CREAT SERPL-MCNC: 0.89 MG/DL — SIGNIFICANT CHANGE UP (ref 0.5–1.3)
EOSINOPHIL # BLD AUTO: 0.05 K/UL — SIGNIFICANT CHANGE UP (ref 0–0.5)
EOSINOPHIL NFR BLD AUTO: 0.4 % — SIGNIFICANT CHANGE UP (ref 0–6)
GLUCOSE SERPL-MCNC: 135 MG/DL — HIGH (ref 70–99)
HCT VFR BLD CALC: 45.9 % — SIGNIFICANT CHANGE UP (ref 39–50)
HGB BLD-MCNC: 15.1 G/DL — SIGNIFICANT CHANGE UP (ref 13–17)
IMM GRANULOCYTES NFR BLD AUTO: 0.5 % — SIGNIFICANT CHANGE UP (ref 0–1.5)
LYMPHOCYTES # BLD AUTO: 1.4 K/UL — SIGNIFICANT CHANGE UP (ref 1–3.3)
LYMPHOCYTES # BLD AUTO: 11.4 % — LOW (ref 13–44)
MCHC RBC-ENTMCNC: 29.6 PG — SIGNIFICANT CHANGE UP (ref 27–34)
MCHC RBC-ENTMCNC: 32.9 GM/DL — SIGNIFICANT CHANGE UP (ref 32–36)
MCV RBC AUTO: 90 FL — SIGNIFICANT CHANGE UP (ref 80–100)
MONOCYTES # BLD AUTO: 0.56 K/UL — SIGNIFICANT CHANGE UP (ref 0–0.9)
MONOCYTES NFR BLD AUTO: 4.6 % — SIGNIFICANT CHANGE UP (ref 2–14)
NEUTROPHILS # BLD AUTO: 10.16 K/UL — HIGH (ref 1.8–7.4)
NEUTROPHILS NFR BLD AUTO: 82.9 % — HIGH (ref 43–77)
NRBC # BLD: 0 /100 WBCS — SIGNIFICANT CHANGE UP (ref 0–0)
PLATELET # BLD AUTO: 275 K/UL — SIGNIFICANT CHANGE UP (ref 150–400)
POTASSIUM SERPL-MCNC: 4.2 MMOL/L — SIGNIFICANT CHANGE UP (ref 3.5–5.3)
POTASSIUM SERPL-SCNC: 4.2 MMOL/L — SIGNIFICANT CHANGE UP (ref 3.5–5.3)
PROT SERPL-MCNC: 6.1 G/DL — SIGNIFICANT CHANGE UP (ref 6–8.3)
RBC # BLD: 5.1 M/UL — SIGNIFICANT CHANGE UP (ref 4.2–5.8)
RBC # FLD: 12.6 % — SIGNIFICANT CHANGE UP (ref 10.3–14.5)
SARS-COV-2 RNA SPEC QL NAA+PROBE: SIGNIFICANT CHANGE UP
SODIUM SERPL-SCNC: 138 MMOL/L — SIGNIFICANT CHANGE UP (ref 135–145)
TROPONIN T, HIGH SENSITIVITY RESULT: 21 NG/L — SIGNIFICANT CHANGE UP (ref 0–51)
TROPONIN T, HIGH SENSITIVITY RESULT: 22 NG/L — SIGNIFICANT CHANGE UP (ref 0–51)
WBC # BLD: 12.26 K/UL — HIGH (ref 3.8–10.5)
WBC # FLD AUTO: 12.26 K/UL — HIGH (ref 3.8–10.5)

## 2022-02-11 PROCEDURE — U0003: CPT

## 2022-02-11 PROCEDURE — 99285 EMERGENCY DEPT VISIT HI MDM: CPT | Mod: 25

## 2022-02-11 PROCEDURE — 85025 COMPLETE CBC W/AUTO DIFF WBC: CPT

## 2022-02-11 PROCEDURE — 71045 X-RAY EXAM CHEST 1 VIEW: CPT | Mod: 26

## 2022-02-11 PROCEDURE — 84484 ASSAY OF TROPONIN QUANT: CPT

## 2022-02-11 PROCEDURE — 71045 X-RAY EXAM CHEST 1 VIEW: CPT

## 2022-02-11 PROCEDURE — 93005 ELECTROCARDIOGRAM TRACING: CPT

## 2022-02-11 PROCEDURE — 82962 GLUCOSE BLOOD TEST: CPT

## 2022-02-11 PROCEDURE — 93280 PM DEVICE PROGR EVAL DUAL: CPT | Mod: 26

## 2022-02-11 PROCEDURE — 80053 COMPREHEN METABOLIC PANEL: CPT

## 2022-02-11 PROCEDURE — 36415 COLL VENOUS BLD VENIPUNCTURE: CPT

## 2022-02-11 PROCEDURE — U0005: CPT

## 2022-02-11 PROCEDURE — 93010 ELECTROCARDIOGRAM REPORT: CPT

## 2022-02-11 NOTE — ED ADULT NURSE NOTE - NSICDXPASTMEDICALHX_GEN_ALL_CORE_FT
PAST MEDICAL HISTORY:  HTN (hypertension)     Symptomatic bradycardia w PPM [employer]

## 2022-02-11 NOTE — ED PROVIDER NOTE - PHYSICAL EXAMINATION
GENERAL: Awake, alert, NAD  HEENT: NC/AT, moist mucous membranes  LUNGS: CTAB, no wheezes or crackles   CARDIAC: slow rate, regular rhythm, no m/r/g  ABDOMEN: Soft, normal BS, non tender, non distended, no rebound, no guarding  BACK: No midline spinal tenderness, no CVA tenderness  EXT: No edema, no calf tenderness, 2+ DP pulses bilaterally, no deformities.  NEURO: A&Ox3. Moving all extremities.  SKIN: Warm and dry. No rash.  PSYCH: Normal affect.

## 2022-02-11 NOTE — ED PROVIDER NOTE - OBJECTIVE STATEMENT
Alzheimers    Atrial Fibrillation    Cholecystitis    CVA (Cerebral Vascular Accident)  1980s no residual deficits  Hyperlipemia 82 y/o M with PMH symptomatic bradycardia s/p PPM, HTN presenting to the ED s/p syncopal episode. Patient was at his optometrist's office when he began to feel lightheaded and then had a syncopal episode. No head injury. Does endorse hx of syncopal episode in the past when he had PPM placed. Last interrogated a few months ago. He denies any CP, SOB, fever, chills, N/V. Cardiologist: Dr. King

## 2022-02-11 NOTE — ED PROVIDER NOTE - ATTENDING CONTRIBUTION TO CARE
81M hx of HTN, sinus atif w PPM here from doctors office with syncopal episode. Pt states seated at time, felt unwell and had +loc, no bowel or bladder incont. no tongue bite or convulsions. No fall to floor. Pt currently denying CP, SOB, HA, dizziness. PE well appearing NAD skin warm moist RRR, lungs CTA BL, abd soft ntndn ext wwp, no fnd. Likely cardiogenic syncope. No hx of DM. Check FS, Labs, EKG, CCM, EP consult.

## 2022-02-11 NOTE — ED ADULT NURSE REASSESSMENT NOTE - NS ED NURSE REASSESS COMMENT FT1
Pt is A&O x 4. Resting comfortably in bed. Denying chest pain, SOB, n/v/d. Awaiting dispo. Call bell within reach.
Pt denying SOB, chest pain, dizziness. Pt ambulates without difficulty. Verbalized understanding of DC paperwork.

## 2022-02-11 NOTE — ED PROVIDER NOTE - PATIENT PORTAL LINK FT
You can access the FollowMyHealth Patient Portal offered by [facility] by registering at the following website: [email or web address]. By joining [employer]’s FollowMyHealth portal, you will also be able to view your health information using other applications (apps) compatible with our system.

## 2022-02-11 NOTE — ED PROVIDER NOTE - NSICDXPASTMEDICALHX_GEN_ALL_CORE_FT
PAST MEDICAL HISTORY:  HTN (hypertension)     Symptomatic bradycardia w PPM [employer]

## 2022-02-11 NOTE — ED PROVIDER NOTE - PROGRESS NOTE DETAILS
EP consulted, will interrogate PPM. Oral Melgar, DO PGY3 Seen by EP, no events correlating w syncopal event, awaiting delta trop. Delta trop negative, patient asymptomatic in the ED. Will discharge. Oral Melgar, DO PGY3

## 2022-02-11 NOTE — PROCEDURE NOTE - ADDITIONAL PROCEDURE DETAILS
1) Indication for interrogation: s/p syncope around 1pm today  2) Presenting rhythm: Sinus rhythm with 1st degree AVB at HR 60's  3) Measured data WNL, normal pacemaker function, Pt is not pacemaker dependent  4) Since 10/12/2021: A pace 7%, V pace 2%, AT/AF burden <1%  5) Since last remote transmission on 12/1/2021, stored data revealed one SVT episode on 12/29/21 lasting 31 seconds. Pt has known hx of SVT. No events noted today, no events correlating to the syncopal episode from today.   6) Discussed with ED provider.    KAITLYNN Hugo, NP-C  [id number]

## 2022-02-11 NOTE — ED ADULT NURSE NOTE - OBJECTIVE STATEMENT
Pt was brought in by ambulance from the ophthalmologist office for syncope. No distress. Breathing easy and non labored. Pt ambulatory. Pt states he was seating down when he felt unwell and passed out +LOC. Denies any chest or SOB prior to the episode. No visible injury noted. Pt is alert and orientedX4 at this time. Emotional support offered.

## 2022-02-11 NOTE — ED PROVIDER NOTE - CLINICAL SUMMARY MEDICAL DECISION MAKING FREE TEXT BOX
80 y/o M with PMH symptomatic bradycardia s/p PPM presents to the ED s/p syncopal episode. Patient is bradycardic on exam. No acute distress. Will consult EP to evaluate PPM, check labs, CXR, reassess. Oral Melgar, DO PGY3

## 2022-02-15 PROBLEM — R00.1 BRADYCARDIA, UNSPECIFIED: Chronic | Status: ACTIVE | Noted: 2022-02-11

## 2022-03-02 ENCOUNTER — APPOINTMENT (OUTPATIENT)
Dept: ELECTROPHYSIOLOGY | Facility: CLINIC | Age: 82
End: 2022-03-02
Payer: MEDICARE

## 2022-03-02 VITALS
DIASTOLIC BLOOD PRESSURE: 83 MMHG | HEART RATE: 68 BPM | WEIGHT: 190 LBS | SYSTOLIC BLOOD PRESSURE: 178 MMHG | OXYGEN SATURATION: 99 % | HEIGHT: 73 IN | BODY MASS INDEX: 25.18 KG/M2

## 2022-03-02 PROCEDURE — 93000 ELECTROCARDIOGRAM COMPLETE: CPT | Mod: 59

## 2022-03-02 PROCEDURE — 99214 OFFICE O/P EST MOD 30 MIN: CPT

## 2022-03-02 PROCEDURE — 93288 INTERROG EVL PM/LDLS PM IP: CPT

## 2022-03-04 ENCOUNTER — NON-APPOINTMENT (OUTPATIENT)
Age: 82
End: 2022-03-04

## 2022-03-04 NOTE — HISTORY OF PRESENT ILLNESS
[FreeTextEntry1] : Dante Rodríguez is an 82 year old man with history of sick sinus syndrome s/p dual chamber pacemaker implantation in 2017 and HTN. In May 2020 he developed persistent atrial flutter and was cardioverted back to an atrial paced rhythm. 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 on 2/10 he noticed graying of his vision in the superior field of his right eye. The following day he presented to his optometrist office for further evaluation and upon funduscopic exam was noted to have a branch retinal artery occlusion or stroke. While waiting in the office the patient experienced a syncopal episode so EMS was called and was he brought to [facility] ED. Patient states he is claustrophobic and faints on occasion in small spaces. He was discharged from the ED that same day and according to ED paperwork it does not appear he mentioned the visual symptoms upon ED arrival. \par \par Today, he reports feeling generally well. He continues to have graying of his vision in the superior field of his right eye. It is not obstructing his vision, it is just a "nuisance." He reports good compliance with Eliquis 5mg BID. \par \par Interrogation today shows normal pace/sense function and battery life. Importantly, there have been no recorded AF episodes on his device since 10/2021, when he was in had AF for a only a few seconds. There were several RYTHMIQ episodes stored secondary to second degree AV-block Type I, so RYTHMIQ was turned off and AV Search was turned on with a max AV delay of 400ms. Of note, he has a slowly rising RV lead impedance that we are monitoring. He is Vpaced only 3%. No threat to lead integrity at this time.\par \par Denies chest pain, shortness of breath, lightheadedness, dizziness, or feelings of near syncope. \par  \par

## 2022-03-04 NOTE — DISCUSSION/SUMMARY
[FreeTextEntry1] : Mr Rodríguez is an 82 year old M CHADSVASC 3 paroxysmal atrial flutter/fibrillation and sick sinus syndrome s/p dual chamber [employer] PPM now with an ocular stroke. Mr. Rodríguez has remained in sinus rhythm for > 6 months and is compliant with Eliquis 5mg BID. It is unlikely that the etiology of the ocular stroke is embolic secondary to AFib. \par \par Would recommend he start ASA 81mg QD and referral will be sent for neurological evaluation. \par \par Mr. Rodríguez appeared to understand the whole discussion and verbalized that all of his questions were answered to his satisfaction. \par \par Thank you for allowing me to be involved in the care of this pleasant man. Please feel free to contact me with any questions.

## 2022-03-22 ENCOUNTER — APPOINTMENT (OUTPATIENT)
Dept: NEUROLOGY | Facility: CLINIC | Age: 82
End: 2022-03-22

## 2022-04-12 ENCOUNTER — APPOINTMENT (OUTPATIENT)
Dept: ELECTROPHYSIOLOGY | Facility: CLINIC | Age: 82
End: 2022-04-12

## 2022-06-02 ENCOUNTER — APPOINTMENT (OUTPATIENT)
Dept: ELECTROPHYSIOLOGY | Facility: CLINIC | Age: 82
End: 2022-06-02
Payer: MEDICARE

## 2022-06-02 ENCOUNTER — NON-APPOINTMENT (OUTPATIENT)
Age: 82
End: 2022-06-02

## 2022-06-02 PROCEDURE — 93294 REM INTERROG EVL PM/LDLS PM: CPT

## 2022-06-02 PROCEDURE — 93296 REM INTERROG EVL PM/IDS: CPT

## 2022-07-13 ENCOUNTER — APPOINTMENT (OUTPATIENT)
Dept: ELECTROPHYSIOLOGY | Facility: CLINIC | Age: 82
End: 2022-07-13

## 2022-08-04 NOTE — PRE-ANESTHESIA EVALUATION ADULT - NSANTHSUBSTSD_GEN_ALL_CORE
Faxed Neuropsychology referral to Sandie Eaton at [phone]. Fax receipt verified via [employer].   No

## 2022-09-02 ENCOUNTER — NON-APPOINTMENT (OUTPATIENT)
Age: 82
End: 2022-09-02

## 2022-09-02 ENCOUNTER — APPOINTMENT (OUTPATIENT)
Dept: ELECTROPHYSIOLOGY | Facility: CLINIC | Age: 82
End: 2022-09-02

## 2022-09-02 VITALS
DIASTOLIC BLOOD PRESSURE: 62 MMHG | WEIGHT: 190 LBS | HEIGHT: 73 IN | OXYGEN SATURATION: 97 % | BODY MASS INDEX: 25.18 KG/M2 | HEART RATE: 64 BPM | SYSTOLIC BLOOD PRESSURE: 168 MMHG

## 2022-09-02 PROCEDURE — 93280 PM DEVICE PROGR EVAL DUAL: CPT

## 2022-09-02 PROCEDURE — 93000 ELECTROCARDIOGRAM COMPLETE: CPT | Mod: 59

## 2022-09-14 ENCOUNTER — NON-APPOINTMENT (OUTPATIENT)
Age: 82
End: 2022-09-14

## 2022-09-16 ENCOUNTER — APPOINTMENT (OUTPATIENT)
Dept: OTOLARYNGOLOGY | Facility: CLINIC | Age: 82
End: 2022-09-16

## 2022-09-16 VITALS
DIASTOLIC BLOOD PRESSURE: 53 MMHG | HEIGHT: 73 IN | TEMPERATURE: 97.9 F | BODY MASS INDEX: 23.86 KG/M2 | WEIGHT: 180 LBS | HEART RATE: 55 BPM | SYSTOLIC BLOOD PRESSURE: 144 MMHG

## 2022-09-16 DIAGNOSIS — H61.23 IMPACTED CERUMEN, BILATERAL: ICD-10-CM

## 2022-09-16 DIAGNOSIS — B02.9 ZOSTER W/OUT COMPLICATIONS: ICD-10-CM

## 2022-09-16 DIAGNOSIS — H92.01 OTALGIA, RIGHT EAR: ICD-10-CM

## 2022-09-16 PROCEDURE — 99203 OFFICE O/P NEW LOW 30 MIN: CPT | Mod: 25

## 2022-09-16 PROCEDURE — 69210 REMOVE IMPACTED EAR WAX UNI: CPT

## 2022-09-16 RX ORDER — GABAPENTIN 100 MG/1
100 CAPSULE ORAL
Refills: 0 | Status: ACTIVE | COMMUNITY

## 2022-09-16 NOTE — END OF VISIT
[FreeTextEntry3] : I personally saw and examined INNA GARCIA in detail. I spoke to CHARITO Rodriguez regarding the assessment and plan of care. I reviewed the above assessment and plan of care, and agree. I have made changes in changes in the body of the note where appropriate.I personally reviewed the HPI, PMH, FH, SH, ROS and medications/allergies. I have spoken to CHARITO Rodriguez regarding the history and have personally determined the assessment and plan of care, and documented this myself. I was present and participated in all key portions of the encounter and all procedures noted above. I have made changes in the body of the note where appropriate.\par \par Attesting Faculty: See Attending Signature Below \par \par \par  [Time Spent: ___ minutes] : I have spent [unfilled] minutes of time on the encounter.

## 2022-09-16 NOTE — ASSESSMENT
[FreeTextEntry1] : Mr. GARCIA 82 year M complains of left otalgia s/p shingles outbreak 2 weeks ago. He is taking Gabapentin daily with mild relief. No change in hearing with shingles. \par \par Otalgia secondary to shingles:\par No skin lesions at this time\par -continue Gabapentin prn \par -reassured pt \par \par Wax\par -Cerumen is removed from the right and left  ear canal with a curette and suction.\par \par f/u prn

## 2022-09-16 NOTE — HISTORY OF PRESENT ILLNESS
[No] : patient does not have a  history of radiation therapy [de-identified] : 83 yo male\par Patient complains of left sided ear pain s/p shingles outbreak on his left ear 2 weeks ago. He is taking Gabapentin daily with mild relief of ear pain. THere are days where the pain is not so bad. Hearing is good. Denies tinnitus or vertigo.  [Hearing Loss] : hearing loss [Presbycusis] : presbycusis [None] : No associated symptoms are reported.

## 2022-11-23 NOTE — ED ADULT NURSE NOTE - CCCP TRG CHIEF CMPLNT
Patient returned call for his FIT results and was given the following message:  \"Notify patient his stool test was negative. Due to the fact that his anemia is worsening I want him to see a gastroenterologist.  Does he have 1? If not I can refer him to somebody. Would he prefer someone at [facility] or [facility]? \"    Patient is requesting a call back from Dr. Ct Feng when possible to further discuss this. syncope with EKG changes

## 2022-12-02 ENCOUNTER — APPOINTMENT (OUTPATIENT)
Dept: ELECTROPHYSIOLOGY | Facility: CLINIC | Age: 82
End: 2022-12-02

## 2022-12-02 ENCOUNTER — NON-APPOINTMENT (OUTPATIENT)
Age: 82
End: 2022-12-02

## 2022-12-02 PROCEDURE — 93296 REM INTERROG EVL PM/IDS: CPT

## 2022-12-02 PROCEDURE — 93294 REM INTERROG EVL PM/LDLS PM: CPT

## 2023-03-07 ENCOUNTER — APPOINTMENT (OUTPATIENT)
Dept: ELECTROPHYSIOLOGY | Facility: CLINIC | Age: 83
End: 2023-03-07
Payer: MEDICARE

## 2023-03-07 ENCOUNTER — NON-APPOINTMENT (OUTPATIENT)
Age: 83
End: 2023-03-07

## 2023-03-07 VITALS — OXYGEN SATURATION: 98 % | SYSTOLIC BLOOD PRESSURE: 170 MMHG | HEART RATE: 56 BPM | DIASTOLIC BLOOD PRESSURE: 58 MMHG

## 2023-03-07 PROCEDURE — 93280 PM DEVICE PROGR EVAL DUAL: CPT

## 2023-03-07 PROCEDURE — 93000 ELECTROCARDIOGRAM COMPLETE: CPT | Mod: 59

## 2023-06-07 ENCOUNTER — APPOINTMENT (OUTPATIENT)
Dept: ELECTROPHYSIOLOGY | Facility: CLINIC | Age: 83
End: 2023-06-07
Payer: MEDICARE

## 2023-06-07 ENCOUNTER — NON-APPOINTMENT (OUTPATIENT)
Age: 83
End: 2023-06-07

## 2023-06-07 PROCEDURE — 93294 REM INTERROG EVL PM/LDLS PM: CPT

## 2023-06-07 PROCEDURE — 93296 REM INTERROG EVL PM/IDS: CPT

## 2023-09-07 ENCOUNTER — NON-APPOINTMENT (OUTPATIENT)
Age: 83
End: 2023-09-07

## 2023-09-07 ENCOUNTER — APPOINTMENT (OUTPATIENT)
Dept: ELECTROPHYSIOLOGY | Facility: CLINIC | Age: 83
End: 2023-09-07
Payer: MEDICARE

## 2023-09-07 VITALS — DIASTOLIC BLOOD PRESSURE: 65 MMHG | SYSTOLIC BLOOD PRESSURE: 173 MMHG | OXYGEN SATURATION: 97 % | HEART RATE: 64 BPM

## 2023-09-07 PROCEDURE — 93000 ELECTROCARDIOGRAM COMPLETE: CPT | Mod: 59

## 2023-09-07 PROCEDURE — 93280 PM DEVICE PROGR EVAL DUAL: CPT

## 2023-10-25 ENCOUNTER — APPOINTMENT (OUTPATIENT)
Dept: UROLOGY | Facility: CLINIC | Age: 83
End: 2023-10-25
Payer: MEDICARE

## 2023-10-25 ENCOUNTER — APPOINTMENT (OUTPATIENT)
Dept: ULTRASOUND IMAGING | Facility: CLINIC | Age: 83
End: 2023-10-25
Payer: MEDICARE

## 2023-10-25 ENCOUNTER — OUTPATIENT (OUTPATIENT)
Dept: OUTPATIENT SERVICES | Facility: HOSPITAL | Age: 83
LOS: 1 days | End: 2023-10-25
Payer: COMMERCIAL

## 2023-10-25 DIAGNOSIS — R33.9 RETENTION OF URINE, UNSPECIFIED: ICD-10-CM

## 2023-10-25 DIAGNOSIS — Z86.79 PERSONAL HISTORY OF OTHER DISEASES OF THE CIRCULATORY SYSTEM: ICD-10-CM

## 2023-10-25 DIAGNOSIS — N40.0 BENIGN PROSTATIC HYPERPLASIA WITHOUT LOWER URINARY TRACT SYMPMS: ICD-10-CM

## 2023-10-25 DIAGNOSIS — R97.20 BENIGN PROSTATIC HYPERPLASIA WITHOUT LOWER URINARY TRACT SYMPMS: ICD-10-CM

## 2023-10-25 PROCEDURE — 76770 US EXAM ABDO BACK WALL COMP: CPT | Mod: 26

## 2023-10-25 PROCEDURE — 99204 OFFICE O/P NEW MOD 45 MIN: CPT

## 2023-10-25 PROCEDURE — 76770 US EXAM ABDO BACK WALL COMP: CPT

## 2023-10-26 LAB
APPEARANCE: CLEAR
BACTERIA: NEGATIVE /HPF
BILIRUBIN URINE: NEGATIVE
BLOOD URINE: NEGATIVE
CAST: 2 /LPF
COLOR: YELLOW
EPITHELIAL CELLS: 1 /HPF
GLUCOSE QUALITATIVE U: NEGATIVE MG/DL
KETONES URINE: NEGATIVE MG/DL
LEUKOCYTE ESTERASE URINE: ABNORMAL
MICROSCOPIC-UA: NORMAL
NITRITE URINE: NEGATIVE
PH URINE: 7
PROTEIN URINE: NEGATIVE MG/DL
PSA FREE FLD-MCNC: 20 %
PSA FREE SERPL-MCNC: 1.43 NG/ML
PSA SERPL-MCNC: 7.11 NG/ML
RED BLOOD CELLS URINE: 0 /HPF
SPECIFIC GRAVITY URINE: 1.02
UROBILINOGEN URINE: 0.2 MG/DL
WHITE BLOOD CELLS URINE: 9 /HPF

## 2023-10-27 LAB — BACTERIA UR CULT: NORMAL

## 2023-10-31 ENCOUNTER — NON-APPOINTMENT (OUTPATIENT)
Age: 83
End: 2023-10-31

## 2023-12-11 ENCOUNTER — NON-APPOINTMENT (OUTPATIENT)
Age: 83
End: 2023-12-11

## 2023-12-11 ENCOUNTER — APPOINTMENT (OUTPATIENT)
Dept: ELECTROPHYSIOLOGY | Facility: CLINIC | Age: 83
End: 2023-12-11
Payer: MEDICARE

## 2023-12-11 PROCEDURE — 93294 REM INTERROG EVL PM/LDLS PM: CPT

## 2023-12-11 PROCEDURE — 93296 REM INTERROG EVL PM/IDS: CPT

## 2024-02-23 ENCOUNTER — NON-APPOINTMENT (OUTPATIENT)
Age: 84
End: 2024-02-23

## 2024-03-11 ENCOUNTER — RESULT CHARGE (OUTPATIENT)
Age: 84
End: 2024-03-11

## 2024-03-12 ENCOUNTER — NON-APPOINTMENT (OUTPATIENT)
Age: 84
End: 2024-03-12

## 2024-03-12 ENCOUNTER — APPOINTMENT (OUTPATIENT)
Dept: ELECTROPHYSIOLOGY | Facility: CLINIC | Age: 84
End: 2024-03-12
Payer: MEDICARE

## 2024-03-12 VITALS
OXYGEN SATURATION: 96 % | DIASTOLIC BLOOD PRESSURE: 63 MMHG | SYSTOLIC BLOOD PRESSURE: 181 MMHG | HEART RATE: 63 BPM | WEIGHT: 195 LBS | BODY MASS INDEX: 25.73 KG/M2

## 2024-03-12 PROCEDURE — 93280 PM DEVICE PROGR EVAL DUAL: CPT

## 2024-03-12 PROCEDURE — 93000 ELECTROCARDIOGRAM COMPLETE: CPT | Mod: 59

## 2024-03-18 ENCOUNTER — TRANSCRIPTION ENCOUNTER (OUTPATIENT)
Age: 84
End: 2024-03-18

## 2024-04-19 ENCOUNTER — NON-APPOINTMENT (OUTPATIENT)
Age: 84
End: 2024-04-19

## 2024-05-09 NOTE — H&P ADULT - NSHPSOURCEINFORD_GEN_ALL_CORE
Called pt. In regards to a referral for a colon screening. Made appt for pt to see breanna on 6/20/24 @3:30pm   Spouse/Significant Other/Patient

## 2024-06-10 ENCOUNTER — NON-APPOINTMENT (OUTPATIENT)
Age: 84
End: 2024-06-10

## 2024-06-11 ENCOUNTER — APPOINTMENT (OUTPATIENT)
Dept: ELECTROPHYSIOLOGY | Facility: CLINIC | Age: 84
End: 2024-06-11
Payer: MEDICARE

## 2024-06-11 PROCEDURE — 93294 REM INTERROG EVL PM/LDLS PM: CPT

## 2024-06-11 PROCEDURE — 93296 REM INTERROG EVL PM/IDS: CPT

## 2024-06-12 NOTE — PATIENT PROFILE ADULT. - ARE SIGNIFICANT INDICATORS COMPLETE.
Hub staff attempted to follow warm transfer process and was unsuccessful     Caller: Heidi Smith    Relationship to patient: Self    Best call back number: [phone]     Patient is needing: PATIENT CALLED TO SCHEDULE LABS.  PLEASE CALL BACK TO SCHEDULE.              Yes

## 2024-09-17 ENCOUNTER — APPOINTMENT (OUTPATIENT)
Dept: ELECTROPHYSIOLOGY | Facility: CLINIC | Age: 84
End: 2024-09-17

## 2024-09-17 ENCOUNTER — NON-APPOINTMENT (OUTPATIENT)
Age: 84
End: 2024-09-17

## 2024-09-17 VITALS — HEART RATE: 60 BPM | OXYGEN SATURATION: 95 % | SYSTOLIC BLOOD PRESSURE: 174 MMHG | DIASTOLIC BLOOD PRESSURE: 67 MMHG

## 2024-09-17 PROCEDURE — 93280 PM DEVICE PROGR EVAL DUAL: CPT

## 2024-09-17 PROCEDURE — 93000 ELECTROCARDIOGRAM COMPLETE: CPT | Mod: XU

## 2024-10-09 ENCOUNTER — NON-APPOINTMENT (OUTPATIENT)
Age: 84
End: 2024-10-09

## 2024-10-09 ENCOUNTER — APPOINTMENT (OUTPATIENT)
Dept: ELECTROPHYSIOLOGY | Facility: CLINIC | Age: 84
End: 2024-10-09
Payer: MEDICARE

## 2024-10-09 VITALS
BODY MASS INDEX: 25.73 KG/M2 | DIASTOLIC BLOOD PRESSURE: 62 MMHG | OXYGEN SATURATION: 96 % | WEIGHT: 195 LBS | HEART RATE: 62 BPM | SYSTOLIC BLOOD PRESSURE: 167 MMHG

## 2024-10-09 PROCEDURE — 93280 PM DEVICE PROGR EVAL DUAL: CPT

## 2024-10-09 PROCEDURE — 93000 ELECTROCARDIOGRAM COMPLETE: CPT | Mod: XU

## 2024-12-17 ENCOUNTER — APPOINTMENT (OUTPATIENT)
Dept: ELECTROPHYSIOLOGY | Facility: CLINIC | Age: 84
End: 2024-12-17

## 2024-12-17 PROCEDURE — 93296 REM INTERROG EVL PM/IDS: CPT

## 2024-12-17 PROCEDURE — 93294 REM INTERROG EVL PM/LDLS PM: CPT

## 2025-03-20 ENCOUNTER — NON-APPOINTMENT (OUTPATIENT)
Age: 85
End: 2025-03-20

## 2025-03-20 ENCOUNTER — APPOINTMENT (OUTPATIENT)
Dept: ELECTROPHYSIOLOGY | Facility: CLINIC | Age: 85
End: 2025-03-20

## 2025-03-20 VITALS
HEART RATE: 62 BPM | OXYGEN SATURATION: 97 % | DIASTOLIC BLOOD PRESSURE: 59 MMHG | BODY MASS INDEX: 25.73 KG/M2 | WEIGHT: 195 LBS | SYSTOLIC BLOOD PRESSURE: 160 MMHG

## 2025-03-20 PROCEDURE — 93280 PM DEVICE PROGR EVAL DUAL: CPT

## 2025-03-20 PROCEDURE — 93000 ELECTROCARDIOGRAM COMPLETE: CPT | Mod: XU

## 2025-04-27 NOTE — ASU PATIENT PROFILE, ADULT - NS SC CAGE ALCOHOL ANNOYED YOU
Causing a recurrent COPD exacerbation due to viral pneumonia  Continuing oxygen support  Finished Keflex   no

## 2025-06-19 ENCOUNTER — APPOINTMENT (OUTPATIENT)
Dept: ELECTROPHYSIOLOGY | Facility: CLINIC | Age: 85
End: 2025-06-19

## 2025-06-19 ENCOUNTER — NON-APPOINTMENT (OUTPATIENT)
Age: 85
End: 2025-06-19

## 2025-06-19 PROCEDURE — 93294 REM INTERROG EVL PM/LDLS PM: CPT

## 2025-06-19 PROCEDURE — 93296 REM INTERROG EVL PM/IDS: CPT
